# Patient Record
Sex: MALE | Race: WHITE | HISPANIC OR LATINO | Employment: UNEMPLOYED | URBAN - METROPOLITAN AREA
[De-identification: names, ages, dates, MRNs, and addresses within clinical notes are randomized per-mention and may not be internally consistent; named-entity substitution may affect disease eponyms.]

---

## 2018-05-14 ENCOUNTER — OFFICE VISIT (OUTPATIENT)
Dept: CARDIOLOGY CLINIC | Facility: CLINIC | Age: 48
End: 2018-05-14
Payer: COMMERCIAL

## 2018-05-14 VITALS
WEIGHT: 247 LBS | HEIGHT: 69 IN | SYSTOLIC BLOOD PRESSURE: 124 MMHG | HEART RATE: 68 BPM | BODY MASS INDEX: 36.58 KG/M2 | DIASTOLIC BLOOD PRESSURE: 78 MMHG

## 2018-05-14 DIAGNOSIS — I10 ESSENTIAL HYPERTENSION: ICD-10-CM

## 2018-05-14 DIAGNOSIS — R00.2 HEART PALPITATIONS: Primary | ICD-10-CM

## 2018-05-14 DIAGNOSIS — R06.00 DOE (DYSPNEA ON EXERTION): ICD-10-CM

## 2018-05-14 PROBLEM — R06.09 DOE (DYSPNEA ON EXERTION): Status: ACTIVE | Noted: 2018-05-14

## 2018-05-14 PROCEDURE — 99243 OFF/OP CNSLTJ NEW/EST LOW 30: CPT | Performed by: INTERNAL MEDICINE

## 2018-05-14 PROCEDURE — 93000 ELECTROCARDIOGRAM COMPLETE: CPT | Performed by: INTERNAL MEDICINE

## 2018-05-14 RX ORDER — BUPRENORPHINE HYDROCHLORIDE, NALOXONE HYDROCHLORIDE 8; 2 MG/1; MG/1
FILM, SOLUBLE BUCCAL; SUBLINGUAL DAILY
COMMUNITY
Start: 2018-05-04 | End: 2018-10-16

## 2018-05-14 RX ORDER — POLYETHYLENE GLYCOL 3350 17 G/17G
POWDER, FOR SOLUTION ORAL AS NEEDED
COMMUNITY
Start: 2018-03-30

## 2018-05-14 RX ORDER — DOCUSATE SODIUM 100 MG/1
CAPSULE, LIQUID FILLED ORAL AS NEEDED
COMMUNITY
Start: 2018-03-30

## 2018-05-14 RX ORDER — TRAZODONE HYDROCHLORIDE 100 MG/1
TABLET ORAL
COMMUNITY
Start: 2018-04-14

## 2018-05-14 RX ORDER — LISINOPRIL AND HYDROCHLOROTHIAZIDE 25; 20 MG/1; MG/1
1 TABLET ORAL DAILY
COMMUNITY
Start: 2018-05-08

## 2018-05-14 NOTE — PROGRESS NOTES
[place]  1970  [id number]  HEART & VASCULAR [facility] [address]    1  Heart palpitations  POCT ECG    Holter monitor - 24 hour    Echo stress test w contrast if indicated   2  CORTES (dyspnea on exertion)  Echo stress test w contrast if indicated   3  Essential hypertension  Echo stress test w contrast if indicated       Discussion/Summary:  1  CORTES/atypical chest pain  2  HTN  3  Moderate Aortic stenosis  4  ? Healed veg on mitral valve  5  Tobacco abuse    Rec: Will obtain all records from [facility]   Will get a stress echo to eval for functional capacity and rule out ischemia  BP well controlled  Need lipids  CXR was normal   If stress normal will consider PFT's  Needs local PCP as well  Check holter as well  Interval History: 49 Male here for new pt eval   Recent episode of CORTES  This occurs with moderate physical activity  Went to [facility] and had an echo that showed moderate AS  No significant Chest pain  Occasional palpitations  Denies fevers, chills, dizziness, or syncope  +TOB abuse  No LE edema  Feeling better now but wants to establish care  Problem List     CORTES (dyspnea on exertion)    Essential hypertension    Heart palpitations        No past medical history on file  Social History     Social History    Marital status: Unknown     Spouse name: N/A    Number of children: N/A    Years of education: N/A     Occupational History    Not on file  Social History Main Topics    Smoking status: Current Every Day Smoker     Types: Cigarettes    Smokeless tobacco: Never Used    Alcohol use Not on file    Drug use: Unknown    Sexual activity: Not on file     Other Topics Concern    Not on file     Social History Narrative    No narrative on file      No family history on file  No past surgical history on file      Current Outpatient Prescriptions:     docusate sodium (COLACE) 100 mg capsule, as needed, Disp: , Rfl:     lisinopril-hydrochlorothiazide (PRINZIDE,ZESTORETIC) 20-25 MG per tablet, , Disp: , Rfl:     polyethylene glycol (GLYCOLAX) powder, as needed, Disp: , Rfl:     SUBOXONE 8-2 MG, daily, Disp: , Rfl:     traZODone (DESYREL) 100 mg tablet, daily at bedtime as needed, Disp: , Rfl:   No Known Allergies    Labs:     Chemistry    No results found for: NA, K, CL, CO2, BUN, CREATININE, GLU No results found for: CALCIUM, ALKPHOS, AST, ALT, BILITOT         No results found for: CHOL  No results found for: HDL  No results found for: LDLCALC  No results found for: TRIG  No components found for: CHOLHDL    Imaging: No results found  ECG:  NSR      Review of Systems   Constitution: Negative  HENT: Negative  Eyes: Negative  Cardiovascular: Positive for dyspnea on exertion and palpitations  Respiratory: Negative  Endocrine: Negative  Hematologic/Lymphatic: Negative  Skin: Negative  Musculoskeletal: Negative  Gastrointestinal: Negative  Genitourinary: Negative  Neurological: Negative  Psychiatric/Behavioral: Negative  Vitals:    05/14/18 0840   BP: 124/78   Pulse: 68     Vitals:    05/14/18 0840   Weight: 112 kg (247 lb)     Height: 5' 8 5" (174 cm)   Body mass index is 37 01 kg/m²  Physical Exam:  General appearance:  Appears stated age, alert, well appearing and in no distress  HEENT:  PERRLA, EOMI, no scleral icterus, no conjunctival pallor  NECK:  Supple, No elevated JVP, no thyromegaly, no carotid bruits  HEART:  Regular rate and rhythm, normal S1/S2, no S3/S4, no murmur or rub  LUNGS:  Clear to auscultation bilaterally  ABDOMEN:  Soft, non-tender, positive bowel sounds, no rebound or guarding, no organomegaly   EXTREMITIES:  No edema  No clubbing, no deformity      VASCULAR:  Normal pedal pulses   SKIN: No lesions or rashes on exposed skin  NEURO:  CN II-XII intact, no focal deficits

## 2018-05-16 ENCOUNTER — PROCEDURE VISIT (OUTPATIENT)
Dept: CARDIOLOGY CLINIC | Facility: CLINIC | Age: 48
End: 2018-05-16
Payer: COMMERCIAL

## 2018-05-16 DIAGNOSIS — R00.2 PALPITATIONS: Primary | ICD-10-CM

## 2018-05-18 ENCOUNTER — HOSPITAL ENCOUNTER (OUTPATIENT)
Dept: NON INVASIVE DIAGNOSTICS | Facility: HOSPITAL | Age: 48
Discharge: HOME | End: 2018-05-18
Payer: COMMERCIAL

## 2018-05-18 DIAGNOSIS — R00.2 HEART PALPITATIONS: ICD-10-CM

## 2018-05-18 PROCEDURE — 93226 XTRNL ECG REC<48 HR SCAN A/R: CPT

## 2018-05-18 PROCEDURE — 93225 XTRNL ECG REC<48 HRS REC: CPT

## 2018-05-18 PROCEDURE — 93224 XTRNL ECG REC UP TO 48 HRS: CPT | Performed by: INTERNAL MEDICINE

## 2018-06-06 LAB
C TRACH RRNA SPEC QL PROBE: NOT DETECTED
N GONORRHOEA RRNA SPEC QL PROBE: NOT DETECTED

## 2018-06-15 ENCOUNTER — HOSPITAL ENCOUNTER (OUTPATIENT)
Dept: NON INVASIVE DIAGNOSTICS | Facility: CLINIC | Age: 48
Discharge: HOME | End: 2018-06-15
Payer: COMMERCIAL

## 2018-06-15 DIAGNOSIS — R06.00 DOE (DYSPNEA ON EXERTION): ICD-10-CM

## 2018-06-15 DIAGNOSIS — I10 ESSENTIAL HYPERTENSION: ICD-10-CM

## 2018-06-15 DIAGNOSIS — R00.2 HEART PALPITATIONS: ICD-10-CM

## 2018-06-15 LAB
ARRHY DURING EX: NORMAL
CHEST PAIN STATEMENT: NORMAL
MAX DIASTOLIC BP: 90 MMHG
MAX HEART RATE: 122 BPM
MAX PREDICTED HEART RATE: 172 BPM
MAX. SYSTOLIC BP: 160 MMHG
PROTOCOL NAME: NORMAL
TARGET HR FORMULA: NORMAL
TEST INDICATION: NORMAL
TIME IN EXERCISE PHASE: NORMAL

## 2018-06-15 PROCEDURE — 93350 STRESS TTE ONLY: CPT

## 2018-06-15 PROCEDURE — 93351 STRESS TTE COMPLETE: CPT | Performed by: INTERNAL MEDICINE

## 2018-07-26 ENCOUNTER — OFFICE VISIT (OUTPATIENT)
Dept: OTOLARYNGOLOGY | Facility: CLINIC | Age: 48
End: 2018-07-26
Payer: COMMERCIAL

## 2018-07-26 VITALS
SYSTOLIC BLOOD PRESSURE: 128 MMHG | DIASTOLIC BLOOD PRESSURE: 81 MMHG | RESPIRATION RATE: 18 BRPM | HEART RATE: 85 BPM | BODY MASS INDEX: 35.92 KG/M2 | HEIGHT: 68 IN | WEIGHT: 237 LBS

## 2018-07-26 DIAGNOSIS — H72.2X1 MARGINAL PERFORATION OF TYMPANIC MEMBRANE OF RIGHT EAR: Primary | ICD-10-CM

## 2018-07-26 DIAGNOSIS — H90.11 CONDUCTIVE HEARING LOSS OF RIGHT EAR WITH UNRESTRICTED HEARING OF LEFT EAR: ICD-10-CM

## 2018-07-26 DIAGNOSIS — H92.11 CHRONIC OTORRHEA OF RIGHT EAR: ICD-10-CM

## 2018-07-26 PROCEDURE — 99204 OFFICE O/P NEW MOD 45 MIN: CPT | Performed by: SPECIALIST

## 2018-07-26 RX ORDER — BUPRENORPHINE HYDROCHLORIDE, NALOXONE HYDROCHLORIDE 12; 3 MG/1; MG/1
FILM, SOLUBLE BUCCAL; SUBLINGUAL
Refills: 0 | COMMUNITY
Start: 2018-07-19

## 2018-07-26 RX ORDER — ALBUTEROL SULFATE 90 UG/1
2 AEROSOL, METERED RESPIRATORY (INHALATION) EVERY 6 HOURS
COMMUNITY

## 2018-07-26 RX ORDER — BUPRENORPHINE HYDROCHLORIDE, NALOXONE HYDROCHLORIDE 2; .5 MG/1; MG/1
FILM, SOLUBLE BUCCAL; SUBLINGUAL
Refills: 0 | COMMUNITY
Start: 2018-07-19

## 2018-07-26 NOTE — PROGRESS NOTES
Otolaryngology Head and Neck Surgery History and Physical    Chief complaint: Ear Problem        History of the Present Illness    Saint Dings is a 49 y o  who presents with complaints of drainage from his right ear  Patient reported that when he was 21 he had trauma to the right ear with a hand over the ear  It subsequently ruptured his eardrum  He had right ear surgery in around 1996 at [facility]  He reported that he had no problems until month ago when he went swimming  He had reported that his ear popped and then developed some liquid, now the right ear  He was seen by his primary care physician and placed on some antibiotic ear drops  He continues with some right ear discharge  He reports that the hearing is decreased  He has had no complaints of any pain or vertigo  Review of Systems   Constitutional: Negative  HENT: Positive for ear discharge, ear pain and hearing loss  Eyes: Positive for visual disturbance  Respiratory: Negative  Cardiovascular: Negative  Gastrointestinal: Negative  Endocrine: Negative  Genitourinary: Negative  Musculoskeletal: Negative  Skin: Negative  Allergic/Immunologic: Negative  Neurological: Negative  Hematological: Negative  Psychiatric/Behavioral: Negative  All other systems reviewed and are negative  Past Medical History:   Diagnosis Date    Ear problems        Past Surgical History:   Procedure Laterality Date    BACK SURGERY         Social History     Social History    Marital status: Unknown     Spouse name: N/A    Number of children: N/A    Years of education: N/A     Occupational History    Not on file       Social History Main Topics    Smoking status: Current Every Day Smoker     Types: Cigarettes    Smokeless tobacco: Never Used    Alcohol use Yes    Drug use: No    Sexual activity: Yes     Other Topics Concern    Not on file     Social History Narrative    No narrative on file       Family History   Problem Relation Age of Onset    Diabetes Father            /81 (BP Location: Right arm, Patient Position: Sitting, Cuff Size: Standard)   Pulse 85   Resp 18   Ht 5' 8" (1 727 m)   Wt 108 kg (237 lb)   BMI 36 04 kg/m²     Physical Exam   Constitutional: He is oriented to person, place, and time  He appears well-developed and well-nourished  HENT:   Head: Normocephalic and atraumatic  Right Ear: External ear normal  No drainage, swelling or tenderness  No mastoid tenderness  Tympanic membrane is not injected and not perforated  Tympanic membrane mobility is normal  No middle ear effusion  Left Ear: External ear normal  No drainage, swelling or tenderness  No mastoid tenderness  Tympanic membrane is not injected and not perforated  Tympanic membrane mobility is normal   No middle ear effusion  Ears:    Nose: Nose normal  No mucosal edema, rhinorrhea, sinus tenderness, nasal deformity or septal deviation  Right sinus exhibits no maxillary sinus tenderness and no frontal sinus tenderness  Left sinus exhibits no maxillary sinus tenderness and no frontal sinus tenderness  Mouth/Throat: Uvula is midline and oropharynx is clear and moist  Mucous membranes are not pale and not dry  No oral lesions  Normal dentition  Eyes: Conjunctivae and EOM are normal  Pupils are equal, round, and reactive to light  Neck: Normal range of motion  Neck supple  No JVD present  No tracheal deviation present  No thyromegaly present  Cardiovascular:   Carotid normal   Jugular no distension   Pulmonary/Chest: No respiratory distress  Abdominal: Soft  He exhibits no distension  Musculoskeletal: Normal range of motion  Lymphadenopathy:     He has no cervical adenopathy  Neurological: He is alert and oriented to person, place, and time  No cranial nerve deficit  Skin: Skin is warm  No rash noted  No erythema  Psychiatric: He has a normal mood and affect   His behavior is normal  Thought content normal          Procedure:      Imaging studies:      Pertinent laboratory data:      Assessment and plan:    1  Marginal perforation of tympanic membrane of right ear  Comprehensive hearing test   2  Chronic otorrhea of right ear     3  Conductive hearing loss of right ear with unrestricted hearing of left ear           1  Patient with perforation right tympanic membrane  Possibly related to when he went swimming  He did dive off of a board which may have caused the rupture of the drum     This time he has some discharge and significant erythema of the drum  I started him on ciprofloxacin ophthalmic drops and dexamethasone ophthalmic drops 2 drops q h s  for 2 weeks and then every Monday and Friday until he is seen back  In addition we will get a hearing test to document his baseline hearing  When we get the year so that we could consider going back for additional surgery

## 2018-07-31 ENCOUNTER — OFFICE VISIT (OUTPATIENT)
Dept: AUDIOLOGY | Age: 48
End: 2018-07-31
Payer: COMMERCIAL

## 2018-07-31 DIAGNOSIS — H90.71 MIXED CONDUCTIVE AND SENSORINEURAL HEARING LOSS, UNILATERAL, RIGHT EAR, WITH UNRESTRICTED HEARING ON THE CONTRALATERAL SIDE: Primary | ICD-10-CM

## 2018-07-31 PROCEDURE — 92557 COMPREHENSIVE HEARING TEST: CPT | Performed by: AUDIOLOGIST-HEARING AID FITTER

## 2018-07-31 PROCEDURE — 92567 TYMPANOMETRY: CPT | Performed by: AUDIOLOGIST-HEARING AID FITTER

## 2018-07-31 NOTE — PROGRESS NOTES
HEARING EVALUATION    Name:  Collette Littles  :  1970  Age:  49 y o  Date of Evaluation: 18     History: Ear Infection, Difficulty Understanding and right tympanoplasty   Reason for visit: Collette Littles is being seen today at the request of Dr Poppy Augustine for an evaluation of hearing  Patient reports recent rupture of right ear drum after swimming  He reports right ear drainage  He reportedly saw an ENT recently who requested today's hearing evaluation as part of medical workup given right perforation  EVALUATION:    Otoscopic Evaluation:   Right Ear: perforation   Left Ear: Clear and healthy ear canal and tympanic membrane    Tympanometry:   Right: Type B (large ear canal volume) - perforated eardrum    Left: Type A - normal middle ear pressure and compliance    Audiogram Results:  Pure tone testing revealed overall normal hearing sensitivity in the left ear and a severe mixed hearing loss in the right ear  SRT and PTA are in agreement indicating good test reliability  Word recognition scores were excellent bilaterally  *see attached audiogram      RECOMMENDATIONS:  Consult ENT, Return to [facility]  for F/U and Copy to Patient/Caregiver  Repeat hearing evaluation post medical management by ENT  PATIENT EDUCATION:   Discussed results and recommendations with Jose Alejandro Hager  Questions were addressed and the patient was encouraged to contact our department should concerns arise        Clemencia Flannery   Clinical Audiologist

## 2018-07-31 NOTE — LETTER
2018     Chad Kana, DO  [address]    Patient: Sara Chavis   YOB: 1970   Date of Visit: 2018       Dear Dr Aileen Miles:    Thank you for referring Sara Chavis to me for evaluation  Below are my notes for this consultation  If you have questions, please do not hesitate to call me  I look forward to following your patient along with you  Sincerely,        Clarence Arra        CC: No Recipients  Clarence Arra  2018  2:52 PM  Sign at close encounter  HEARING EVALUATION    Name:  Sara Chavis  :  1970  Age:  49 y o  Date of Evaluation: 18     History: Ear Infection, Difficulty Understanding and right tympanoplasty   Reason for visit: Sara Chavis is being seen today at the request of Dr Aileen Miles for an evaluation of hearing  Patient reports recent rupture of right ear drum after swimming  He reports right ear drainage  He reportedly saw an ENT recently who requested today's hearing evaluation as part of medical workup given right perforation  EVALUATION:    Otoscopic Evaluation:   Right Ear: perforation   Left Ear: Clear and healthy ear canal and tympanic membrane    Tympanometry:   Right: Type B (large ear canal volume) - perforated eardrum    Left: Type A - normal middle ear pressure and compliance    Audiogram Results:  Pure tone testing revealed overall normal hearing sensitivity in the left ear and a severe mixed hearing loss in the right ear  SRT and PTA are in agreement indicating good test reliability  Word recognition scores were excellent bilaterally  *see attached audiogram      RECOMMENDATIONS:  Consult ENT, Return to [facility]  for F/U and Copy to Patient/Caregiver  Repeat hearing evaluation post medical management by ENT  PATIENT EDUCATION:   Discussed results and recommendations with Taylor García  Questions were addressed and the patient was encouraged to contact our department should concerns arise        Oralia Meredith Clemencia Sams   Clinical Audiologist

## 2018-10-16 ENCOUNTER — OFFICE VISIT (OUTPATIENT)
Dept: CARDIOLOGY CLINIC | Facility: CLINIC | Age: 48
End: 2018-10-16
Payer: COMMERCIAL

## 2018-10-16 VITALS
BODY MASS INDEX: 36.58 KG/M2 | HEIGHT: 69 IN | SYSTOLIC BLOOD PRESSURE: 136 MMHG | HEART RATE: 78 BPM | WEIGHT: 247 LBS | DIASTOLIC BLOOD PRESSURE: 82 MMHG

## 2018-10-16 DIAGNOSIS — I35.0 MODERATE AORTIC STENOSIS: ICD-10-CM

## 2018-10-16 DIAGNOSIS — R00.2 HEART PALPITATIONS: ICD-10-CM

## 2018-10-16 DIAGNOSIS — I10 ESSENTIAL HYPERTENSION: Primary | ICD-10-CM

## 2018-10-16 DIAGNOSIS — R06.00 DOE (DYSPNEA ON EXERTION): ICD-10-CM

## 2018-10-16 PROCEDURE — 99214 OFFICE O/P EST MOD 30 MIN: CPT | Performed by: INTERNAL MEDICINE

## 2018-10-16 RX ORDER — DULOXETIN HYDROCHLORIDE 60 MG/1
60 CAPSULE, DELAYED RELEASE ORAL DAILY
COMMUNITY
Start: 2018-10-16

## 2018-10-16 NOTE — PROGRESS NOTES
Beverly Casey  1970  [id number]  HEART & VASCULAR [facility] [address]    1  Essential hypertension     2  Heart palpitations     3  CORTES (dyspnea on exertion)     4  Moderate aortic stenosis  Echo complete with contrast if indicated       Discussion/Summary:  1  CORTES/atypical chest pain  2  HTN  3  Moderate Aortic stenosis  4  ? Healed veg on mitral valve  5  Tobacco abuse    Rec:  Overall he has been doing well since her last visit  He has a good functional capacity with minimal symptoms of shortness of breath  His stress echo showed no ischemia  Aortic stenosis has been moderate  With activity gradients go up but that would be expected  I have ordered a formal transthoracic echocardiogram to be reviewed by myself to determine the degree of aortic stenosis and also evaluate for mitral regurgitation  He has me if it was okay to donate plasma I do not see a problem with this      Interval History: 49 Male here for new pt eval   Recent episode of CORTES  This occurs with moderate physical activity  He denies any exertional chest pressure heaviness  He does have moderate aortic stenosis seen on echocardiogram at [facility] and also on stress echo here  Functional capacity has been acceptable  He is a smoker he has shortness of breath for some time  Need to evaluate whether the aortic valve is bicuspid trileaflet  He denies any lower extremity edema, PND, orthopnea  Problem List     CORTES (dyspnea on exertion)    Essential hypertension    Heart palpitations        Past Medical History:   Diagnosis Date    Ear problems      Social History     Social History    Marital status: Unknown     Spouse name: N/A    Number of children: N/A    Years of education: N/A     Occupational History    Not on file       Social History Main Topics    Smoking status: Current Every Day Smoker     Types: Cigarettes    Smokeless tobacco: Never Used  Alcohol use Yes    Drug use: No    Sexual activity: Yes     Other Topics Concern    Not on file     Social History Narrative    No narrative on file      Family History   Problem Relation Age of Onset    Diabetes Father      Past Surgical History:   Procedure Laterality Date    BACK SURGERY         Current Outpatient Prescriptions:     albuterol (PROVENTIL HFA,VENTOLIN HFA) 90 mcg/act inhaler, Inhale 2 puffs every 6 (six) hours, Disp: , Rfl:     docusate sodium (COLACE) 100 mg capsule, as needed, Disp: , Rfl:     DULoxetine (CYMBALTA) 30 mg delayed release capsule, , Disp: , Rfl:     lisinopril-hydrochlorothiazide (PRINZIDE,ZESTORETIC) 20-25 MG per tablet, Take 1 tablet by mouth daily  , Disp: , Rfl:     polyethylene glycol (GLYCOLAX) powder, as needed, Disp: , Rfl:     SUBOXONE 12-3 MG FILM, TAKE 1 FILM SUBLINGUALY WITH 2MG STRIPS TOTAL 14MG DAILY, Disp: , Rfl: 0    SUBOXONE 2-0 5 MG, TAKE 1 FILM SUBLINGUALY EVERY DAY TOTAL 14MG DAILY, Disp: , Rfl: 0    traZODone (DESYREL) 100 mg tablet, daily at bedtime as needed, Disp: , Rfl:   No Known Allergies    Labs:     Chemistry    No results found for: NA, K, CL, CO2, BUN, CREATININE, GLU No results found for: CALCIUM, ALKPHOS, AST, ALT, BILITOT         No results found for: CHOL  No results found for: HDL  No results found for: LDLCALC  No results found for: TRIG  No components found for: CHOLHDL    Imaging: No results found  ECG:  NSR      Review of Systems   Constitution: Negative  HENT: Negative  Eyes: Negative  Cardiovascular: Positive for dyspnea on exertion and palpitations  Respiratory: Negative  Endocrine: Negative  Hematologic/Lymphatic: Negative  Skin: Negative  Musculoskeletal: Negative  Gastrointestinal: Negative  Genitourinary: Negative  Neurological: Negative  Psychiatric/Behavioral: Negative          Vitals:    10/16/18 1115   BP: 136/82   Pulse: 78     Vitals:    10/16/18 1115   Weight: 112 kg (247 lb) Height: 5' 8 5" (174 cm)   Body mass index is 37 01 kg/m²  Physical Exam:  General appearance:  Appears stated age, alert, well appearing and in no distress  HEENT:  PERRLA, EOMI, no scleral icterus, no conjunctival pallor  NECK:  Supple, No elevated JVP, no thyromegaly, no carotid bruits  HEART:  Regular rate and rhythm, normal S1/S2, no S3/S4, no murmur or rub  LUNGS:  Clear to auscultation bilaterally  ABDOMEN:  Soft, non-tender, positive bowel sounds, no rebound or guarding, no organomegaly   EXTREMITIES:  No edema  No clubbing, no deformity      VASCULAR:  Normal pedal pulses   SKIN: No lesions or rashes on exposed skin  NEURO:  CN II-XII intact, no focal deficits

## 2018-11-13 ENCOUNTER — HOSPITAL ENCOUNTER (OUTPATIENT)
Dept: NON INVASIVE DIAGNOSTICS | Facility: CLINIC | Age: 48
Discharge: HOME | End: 2018-11-13
Payer: COMMERCIAL

## 2018-11-13 DIAGNOSIS — I35.0 MODERATE AORTIC STENOSIS: ICD-10-CM

## 2018-11-13 PROCEDURE — 93306 TTE W/DOPPLER COMPLETE: CPT | Performed by: INTERNAL MEDICINE

## 2018-11-13 PROCEDURE — 93306 TTE W/DOPPLER COMPLETE: CPT

## 2019-02-22 ENCOUNTER — TELEPHONE (OUTPATIENT)
Dept: CARDIOLOGY CLINIC | Facility: CLINIC | Age: 49
End: 2019-02-22

## 2019-02-22 NOTE — TELEPHONE ENCOUNTER
FYI:  Pt concerned he is experiencing discomfort when he pushes on the center of his chest, coughs, or moves   such as sitting up in bed  Stated he has not done any heavy lifting to indicate a muscle injury  Advised to call PCP or go to urgent care to be evaluated     Pt scheduled an OV for 4/26/19

## 2019-04-26 ENCOUNTER — OFFICE VISIT (OUTPATIENT)
Dept: CARDIOLOGY CLINIC | Facility: CLINIC | Age: 49
End: 2019-04-26
Payer: COMMERCIAL

## 2019-04-26 VITALS
HEART RATE: 68 BPM | HEIGHT: 69 IN | DIASTOLIC BLOOD PRESSURE: 86 MMHG | WEIGHT: 248 LBS | SYSTOLIC BLOOD PRESSURE: 148 MMHG | BODY MASS INDEX: 36.73 KG/M2

## 2019-04-26 DIAGNOSIS — R06.00 DOE (DYSPNEA ON EXERTION): ICD-10-CM

## 2019-04-26 DIAGNOSIS — I10 ESSENTIAL HYPERTENSION: ICD-10-CM

## 2019-04-26 DIAGNOSIS — R00.2 HEART PALPITATIONS: ICD-10-CM

## 2019-04-26 DIAGNOSIS — I35.0 MODERATE AORTIC STENOSIS: Primary | ICD-10-CM

## 2019-04-26 PROCEDURE — 99214 OFFICE O/P EST MOD 30 MIN: CPT | Performed by: INTERNAL MEDICINE

## 2019-04-26 RX ORDER — BACLOFEN 10 MG/1
10 TABLET ORAL
COMMUNITY

## 2021-08-03 ENCOUNTER — HOSPITAL ENCOUNTER (EMERGENCY)
Facility: HOSPITAL | Age: 51
Discharge: HOME | End: 2021-08-03
Attending: EMERGENCY MEDICINE | Admitting: EMERGENCY MEDICINE
Payer: COMMERCIAL

## 2021-08-03 VITALS
TEMPERATURE: 97.3 F | OXYGEN SATURATION: 96 % | DIASTOLIC BLOOD PRESSURE: 98 MMHG | SYSTOLIC BLOOD PRESSURE: 194 MMHG | HEART RATE: 80 BPM | RESPIRATION RATE: 18 BRPM

## 2021-08-03 DIAGNOSIS — L03.90 CELLULITIS: Primary | ICD-10-CM

## 2021-08-03 PROCEDURE — 99284 EMERGENCY DEPT VISIT MOD MDM: CPT | Performed by: EMERGENCY MEDICINE

## 2021-08-03 PROCEDURE — 99282 EMERGENCY DEPT VISIT SF MDM: CPT

## 2021-08-03 RX ORDER — CEPHALEXIN 500 MG/1
500 CAPSULE ORAL EVERY 6 HOURS SCHEDULED
Qty: 20 CAPSULE | Refills: 0 | Status: SHIPPED | OUTPATIENT
Start: 2021-08-03 | End: 2021-08-03

## 2021-08-03 NOTE — ED PROVIDER NOTES
History  Chief Complaint   Patient presents with    Abscess     pt reports abscess on right buttocks since this morning  reports tenderness     58-year-old male history of asthma, hypertension, presenting due to lesion on buttock  Patient states he noticed a pimple like mass on his right buttock that started yesterday and has increased in size today  States it is slightly irritating but not painful to palpation  Believes it is hard to touch and increasing in size  Denies any fevers, chills, nausea or vomiting  Denies any hematochezia, constipation, pain with defecation, hematuria, urinary symptoms, injury or trauma  Denies any history of abscesses  Prior to Admission Medications   Prescriptions Last Dose Informant Patient Reported? Taking?    DULoxetine (CYMBALTA) 60 mg delayed release capsule  Self Yes No   Sig: Take 60 mg by mouth daily    SUBOXONE 12-3 MG FILM  Self Yes No   Sig: TAKE 1 FILM SUBLINGUALY WITH 2MG STRIPS TOTAL 14MG DAILY   SUBOXONE 2-0 5 MG  Self Yes No   Sig: TAKE 1 FILM SUBLINGUALY EVERY DAY TOTAL 14MG DAILY   albuterol (PROVENTIL HFA,VENTOLIN HFA) 90 mcg/act inhaler  Self Yes No   Sig: Inhale 2 puffs every 6 (six) hours   baclofen 10 mg tablet  Self Yes No   Sig: Take 10 mg by mouth Daily for 6 days   docusate sodium (COLACE) 100 mg capsule  Self Yes No   Sig: as needed   lisinopril-hydrochlorothiazide (PRINZIDE,ZESTORETIC) 20-25 MG per tablet  Self Yes No   Sig: Take 1 tablet by mouth daily     polyethylene glycol (GLYCOLAX) powder  Self Yes No   Sig: as needed   traZODone (DESYREL) 100 mg tablet  Self Yes No   Sig: daily at bedtime as needed      Facility-Administered Medications: None       Past Medical History:   Diagnosis Date    Ear problems        Past Surgical History:   Procedure Laterality Date    BACK SURGERY      KNEE SURGERY Left 01/23/2019       Family History   Problem Relation Age of Onset    Diabetes Father      I have reviewed and agree with the history as documented  E-Cigarette/Vaping     E-Cigarette/Vaping Substances     Social History     Tobacco Use    Smoking status: Current Every Day Smoker     Types: Cigarettes    Smokeless tobacco: Never Used   Substance Use Topics    Alcohol use: Yes    Drug use: No        Review of Systems   Constitutional: Negative for chills and fever  HENT: Negative for ear pain and sore throat  Eyes: Negative for visual disturbance  Respiratory: Negative for cough and shortness of breath  Cardiovascular: Negative for chest pain and palpitations  Gastrointestinal: Negative for abdominal pain, anal bleeding, blood in stool, constipation, rectal pain and vomiting  Genitourinary: Negative for dysuria  Musculoskeletal: Negative for arthralgias and back pain  Skin: Negative for color change and rash  Small area of induration to right buttock   Neurological: Negative for syncope  All other systems reviewed and are negative  Physical Exam  ED Triage Vitals [08/03/21 0727]   Temperature Pulse Respirations Blood Pressure SpO2   (!) 97 3 °F (36 3 °C) 80 18 (!) 194/98 96 %      Temp Source Heart Rate Source Patient Position - Orthostatic VS BP Location FiO2 (%)   Tympanic Monitor Sitting Right arm --      Pain Score       7             Orthostatic Vital Signs  Vitals:    08/03/21 0727   BP: (!) 194/98   Pulse: 80   Patient Position - Orthostatic VS: Sitting       Physical Exam  Vitals and nursing note reviewed  Constitutional:       Appearance: He is well-developed  HENT:      Head: Normocephalic and atraumatic  Eyes:      Conjunctiva/sclera: Conjunctivae normal    Cardiovascular:      Rate and Rhythm: Normal rate and regular rhythm  Heart sounds: No murmur heard  Pulmonary:      Effort: Pulmonary effort is normal  No respiratory distress  Breath sounds: Normal breath sounds  Abdominal:      Palpations: Abdomen is soft  Tenderness: There is no abdominal tenderness     Musculoskeletal: Cervical back: Neck supple  Skin:     General: Skin is warm and dry  Comments: Small 1 centimeter area of induration and erythema around 3 inches lateral from rectum   Neurological:      Mental Status: He is alert and oriented to person, place, and time  Psychiatric:         Mood and Affect: Mood normal          ED Medications  Medications - No data to display    Diagnostic Studies  Results Reviewed     None                 No orders to display         Procedures  Procedures      ED Course                                       MDM  Number of Diagnoses or Management Options  Cellulitis  Diagnosis management comments: Ultrasound was placed over area of induration and no area of abscess or fluid was noted  Concern for local cellulitic infections so patient was given short course of antibiotics  Will follow-up with PCP and return precautions advised      Disposition  Final diagnoses:   Cellulitis     Time reflects when diagnosis was documented in both MDM as applicable and the Disposition within this note     Time User Action Codes Description Comment    8/3/2021  8:06 AM Erin Linder [[id number]] Cellulitis       ED Disposition     ED Disposition Condition Date/Time Comment    Discharge Stable Tues Aug 3, 2021  8:06 AM Collette Littles discharge to home/self care            Follow-up Information     Follow up With Specialties Details Why Contact Info Additional [address]   [address], [address] [phone]  [address], [address], [address] [place], [place], [address]          Current Discharge Medication List      START taking these medications    Details   albuterol (PROVENTIL HFA,VENTOLIN HFA) 90 mcg/act inhaler Inhale 2 puffs every 6 (six) hours      baclofen 10 mg tablet Take 10 mg by mouth Daily for 6 days      docusate sodium (COLACE) 100 mg capsule as needed      DULoxetine (CYMBALTA) 60 mg delayed release capsule Take 60 mg by mouth daily       lisinopril-hydrochlorothiazide (PRINZIDE,ZESTORETIC) 20-25 MG per tablet Take 1 tablet by mouth daily        polyethylene glycol (GLYCOLAX) powder as needed      SUBOXONE 12-3 MG FILM TAKE 1 FILM SUBLINGUALY WITH 2MG STRIPS TOTAL 14MG DAILY  Refills: 0      SUBOXONE 2-0 5 MG TAKE 1 FILM SUBLINGUALY EVERY DAY TOTAL 14MG DAILY  Refills: 0      traZODone (DESYREL) 100 mg tablet daily at bedtime as needed           No discharge procedures on file  PDMP Review     None           ED Provider  Attending physically available and evaluated Ry Pastures  I managed the patient along with the ED Attending      Electronically Signed by         Gabby West,   08/03/21 [id number] Diane Orantes,   08/25/21 [id number]

## 2021-08-03 NOTE — DISCHARGE INSTRUCTIONS
Antibiotic 7 sent to your pharmacy  Please take as prescribed  Please schedule follow-up appointment with her PCP in regards to recent hospital visit  If you do not have a PCP, please use the contact information provided in order to establish care

## 2021-08-04 NOTE — ED ATTENDING ATTESTATION
8/3/2021  IArjun MD, saw and evaluated the patient  I have discussed the patient with the resident/non-physician practitioner and agree with the resident's/non-physician practitioner's findings, Plan of Care, and MDM as documented in the resident's/non-physician practitioner's note, except where noted  All available labs and Radiology studies were reviewed  I was present for key portions of any procedure(s) performed by the resident/non-physician practitioner and I was immediately available to provide assistance  At this point I agree with the current assessment done in the Emergency Department  I have conducted an independent evaluation of this patient a history and physical is as follows:    ED Course     Patient is a 77-year-old male presents with pain localized over the right buttock  Patient states that head lesion there which increased size    Denies fevers chills back pain abdominal pain pain with defecation rectal pain as prior history of abscesses    Vitals reviewed    Examination of the bike shows a 1 cm area of induration erythema or buttocks  Patient does not appear to involve the rectum  There is no obvious cutaneous abscess on point of care bedside ultrasound  Suspect likely cellulitis    Impression:  Cellulitis versus resolving abscess  Discussed plan of care patient will provide patient prescription for antibiotics patient counseled to monitor area for recurrence of symptoms  Return precautions given       Critical Care Time  Procedures

## 2021-09-01 ENCOUNTER — HOSPITAL ENCOUNTER (OUTPATIENT)
Dept: RADIOLOGY | Facility: HOSPITAL | Age: 51
Discharge: HOME | End: 2021-09-01
Payer: COMMERCIAL

## 2021-09-01 DIAGNOSIS — Z12.2 ENCOUNTER FOR SCREENING FOR LUNG CANCER: ICD-10-CM

## 2021-09-01 PROCEDURE — 71271 CT THORAX LUNG CANCER SCR C-: CPT

## 2022-04-05 ENCOUNTER — OFFICE VISIT (OUTPATIENT)
Dept: PODIATRY | Facility: CLINIC | Age: 52
End: 2022-04-05
Payer: MEDICARE

## 2022-04-05 VITALS
HEIGHT: 69 IN | DIASTOLIC BLOOD PRESSURE: 82 MMHG | SYSTOLIC BLOOD PRESSURE: 110 MMHG | WEIGHT: 258 LBS | BODY MASS INDEX: 38.21 KG/M2

## 2022-04-05 DIAGNOSIS — M72.2 PLANTAR FASCIITIS: Primary | ICD-10-CM

## 2022-04-05 DIAGNOSIS — M79.672 LEFT FOOT PAIN: ICD-10-CM

## 2022-04-05 DIAGNOSIS — E11.9 CONTROLLED TYPE 2 DIABETES MELLITUS WITHOUT COMPLICATION, WITHOUT LONG-TERM CURRENT USE OF INSULIN (HCC): ICD-10-CM

## 2022-04-05 PROCEDURE — 99204 OFFICE O/P NEW MOD 45 MIN: CPT | Performed by: PODIATRIST

## 2022-04-05 RX ORDER — DAPAGLIFLOZIN 5 MG/1
TABLET, FILM COATED ORAL
COMMUNITY
Start: 2022-01-11

## 2022-04-05 RX ORDER — AMLODIPINE BESYLATE 10 MG/1
TABLET ORAL
COMMUNITY
Start: 2022-03-03

## 2022-04-05 RX ORDER — HYDROCHLOROTHIAZIDE 25 MG/1
TABLET ORAL
COMMUNITY
Start: 2022-02-07

## 2022-04-05 RX ORDER — INSULIN GLARGINE 100 [IU]/ML
INJECTION, SOLUTION SUBCUTANEOUS
COMMUNITY
Start: 2022-04-04

## 2022-04-05 RX ORDER — GLIPIZIDE 10 MG/1
TABLET ORAL
COMMUNITY
Start: 2022-01-11

## 2022-04-05 RX ORDER — ASPIRIN 81 MG/1
TABLET ORAL
COMMUNITY
Start: 2022-02-07

## 2022-04-05 RX ORDER — PEN NEEDLE, DIABETIC 31 GX5/16"
NEEDLE, DISPOSABLE MISCELLANEOUS
COMMUNITY
Start: 2021-12-27

## 2022-04-05 RX ORDER — ATORVASTATIN CALCIUM 20 MG/1
TABLET, FILM COATED ORAL
COMMUNITY
Start: 2022-02-07

## 2022-04-05 RX ORDER — GABAPENTIN 300 MG/1
CAPSULE ORAL
COMMUNITY
Start: 2022-04-04

## 2022-04-05 RX ORDER — LANCING DEVICE
EACH MISCELLANEOUS
COMMUNITY
Start: 2022-03-03

## 2022-04-05 NOTE — PROGRESS NOTES
PATIENT:  Olga Alvarez  1970       ASSESSMENT:     1  Plantar fasciitis  XR heel / calcaneus 2+ vw left    Ambulatory referral to Physical Therapy   2  Left foot pain     3  Controlled type 2 diabetes mellitus without complication, without long-term current use of insulin ([facility] )             PLAN:  1  Patient was counseled and educated on the condition and the diagnosis  2  X-ray was obtained and personally reviewed  The radiological findings were discussed with the patient  3  The exam and symptoms are consistent with plantar fasciitis  The diagnosis, treatment options and prognosis were discussed with the patient  4  Hold injection due to diabetes  Awaits blood work  5  Referred him to PT/OT  Instructed supportive care, home exercise, icing, and proper footwear/ arch support  6  Educated disease prevention and risks related to diabetes  Educated proper daily foot care and exam   Instructed proper skin care / protection and footwear  Instructed to identify any signs of infection and related foot problem  7   Discussed proper blood glucose control with diet and exercise  8   Recommended compression stockings for LE edema  9   Patient will return in 4 weeks for re-evaluation  Subjective:       HPI  The patient presents with chief complaint of left foot pain for about 3 months  He has sharp, throbbing pain on left plantar heel with walking  He also has post-static dyskinesia  The symptoms have been worse since the onset  The patient does not recall any injury  No acute swelling, but she has chronic swelling in his legs  The patient has diabetes about a year  His blood glucose is up and down  He has not had blood work for a while  The patient denied any history of diabetic foot ulcer, related foot infection, or amputation  No significant numbness  He has mild paresthesia in his toes  Denied weakness or significant functional deficit          The following portions of the patient's history were reviewed and updated as appropriate: allergies, current medications, past family history, past medical history, past social history, past surgical history and problem list   All pertinent labs and images were reviewed  Past Medical History  Past Medical History:   Diagnosis Date    Arthritis     back and leg pain    Asthma     Diabetes ([address] )     Ear problems        Past Surgical History  Past Surgical History:   Procedure Laterality Date    BACK SURGERY      KNEE SURGERY Left 01/23/2019        Allergies:  Patient has no known allergies  Medications:  Current Outpatient Medications   Medication Sig Dispense Refill    albuterol (PROVENTIL HFA,VENTOLIN HFA) 90 mcg/act inhaler Inhale 2 puffs every 6 (six) hours      amLODIPine (NORVASC) 10 mg tablet TAKE 1 TABLET(S) EVERY DAY BY ORAL ROUTE FOR 90 DAYS   aspirin (ECOTRIN LOW STRENGTH) 81 mg EC tablet TAKE 1 TABLET(S) EVERY DAY BY ORAL ROUTE FOR 90 DAYS   atorvastatin (LIPITOR) 20 mg tablet TAKE 1 TABLET(S) EVERY DAY BY ORAL ROUTE FOR 90 DAYS  CHOLESTEROL PILL      B-D UF III MINI PEN NEEDLES 31G X 5 MM MISC USE AS DIRECTED WITH INSULINE      baclofen 10 mg tablet Take 10 mg by mouth Daily for 6 days      docusate sodium (COLACE) 100 mg capsule as needed      DULoxetine (CYMBALTA) 60 mg delayed release capsule Take 60 mg by mouth daily       Farxiga 5 MG TABS TAKE 1 TABLET EVERY DAY BY ORAL ROUTE FOR 30 DAYS   fluticasone-salmeterol (Advair) 500-50 mcg/dose inhaler INHALE 1 PUFF(S) TWICE A DAY BY INHALATION ROUTE      gabapentin (NEURONTIN) 300 mg capsule TAKE 1 CAPSULE 3 TIMES A DAY BY ORAL ROUTE FOR 90 DAYS   glipiZIDE (GLUCOTROL) 10 mg tablet TAKE 1 TABLET EVERY DAY BY ORAL ROUTE   hydrochlorothiazide (HYDRODIURIL) 25 mg tablet TAKE 1 TABLET(S) EVERY DAY BY ORAL ROUTE FOR 90 DAYS        Lantus SoloStar 100 units/mL injection pen INJECT 26 UNITS EVERY DAY BY SUBCUTANEOUS ROUTE IN THE MORNING FOR 30 DAYS   lisinopril-hydrochlorothiazide (PRINZIDE,ZESTORETIC) 20-25 MG per tablet Take 1 tablet by mouth daily        metFORMIN (GLUCOPHAGE) 1000 MG tablet TAKE 1 TABLET(S) TWICE A DAY BY ORAL ROUTE FOR 90 DAYS   Pharmacist Choice Lancets MISC USE AS DIRECTED  TEST EVERY DAY PER MD      polyethylene glycol (GLYCOLAX) powder as needed      SUBOXONE 12-3 MG FILM TAKE 1 FILM SUBLINGUALY WITH 2MG STRIPS TOTAL 14MG DAILY  0    SUBOXONE 2-0 5 MG TAKE 1 FILM SUBLINGUALY EVERY DAY TOTAL 14MG DAILY  0    traZODone (DESYREL) 100 mg tablet daily at bedtime as needed       No current facility-administered medications for this visit  Social History:  Social History     Socioeconomic History    Marital status: Unknown     Spouse name: None    Number of children: None    Years of education: None    Highest education level: None   Occupational History    None   Tobacco Use    Smoking status: Current Every Day Smoker     Types: Cigarettes    Smokeless tobacco: Never Used   Substance and Sexual Activity    Alcohol use: Yes    Drug use: No    Sexual activity: Yes   Other Topics Concern    None   Social History Narrative    None     Social Determinants of Health     Financial Resource Strain: Not on file   Food Insecurity: Not on file   Transportation Needs: Not on file   Physical Activity: Not on file   Stress: Not on file   Social Connections: Not on file   Intimate Partner Violence: Not on file   Housing Stability: Not on file          Review of Systems   Constitutional: Negative for appetite change, chills and fever  HENT: Negative for sore throat  Eyes: Negative for visual disturbance  Respiratory: Negative for cough and shortness of breath  Cardiovascular: Positive for leg swelling  Negative for chest pain  Gastrointestinal: Negative for diarrhea, nausea and vomiting  Musculoskeletal: Positive for arthralgias  Skin: Negative for rash and wound     Neurological: Negative for weakness and numbness  Hematological: Negative  Psychiatric/Behavioral: Negative for behavioral problems and confusion  Objective:      /82   Ht 5' 8 5" (1 74 m) Comment: verbal  Wt 117 kg (258 lb)   BMI 38 66 kg/m²          Physical Exam  Vitals reviewed  Constitutional:       General: He is not in acute distress  Appearance: He is obese  He is not toxic-appearing  HENT:      Head: Normocephalic and atraumatic  Eyes:      Extraocular Movements: Extraocular movements intact  Cardiovascular:      Rate and Rhythm: Normal rate and regular rhythm  Pulses: Normal pulses  no weak pulses          Dorsalis pedis pulses are 2+ on the right side and 2+ on the left side  Posterior tibial pulses are 2+ on the right side and 2+ on the left side  Pulmonary:      Effort: Pulmonary effort is normal  No respiratory distress  Musculoskeletal:         General: Tenderness present  No swelling, deformity or signs of injury  Cervical back: Normal range of motion and neck supple  Right lower leg: Edema present  Left lower leg: Edema present  Right foot: No Charcot foot or foot drop  Left foot: No Charcot foot or foot drop  Comments: Focal pain in left plantar heel and instep  No acute edema  No pain on lateral compression left heel  Tight calf / achilles with ankle equinus  Feet:      Right foot:      Protective Sensation: 10 sites tested  10 sites sensed  Skin integrity: No ulcer, skin breakdown or erythema  Left foot:      Protective Sensation: 10 sites tested  10 sites sensed  Skin integrity: No ulcer, skin breakdown or erythema  Skin:     General: Skin is warm and dry  Capillary Refill: Capillary refill takes less than 2 seconds  Coloration: Skin is not cyanotic or mottled  Findings: No abscess, ecchymosis, erythema or wound  Nails: There is no clubbing  Neurological:      General: No focal deficit present  Mental Status: He is alert and oriented to person, place, and time  Cranial Nerves: No cranial nerve deficit  Sensory: No sensory deficit  Motor: No weakness  Coordination: Coordination normal    Psychiatric:         Mood and Affect: Mood normal          Behavior: Behavior normal          Thought Content: Thought content normal          Judgment: Judgment normal            Diabetic Foot Exam    Patient's shoes and socks removed  Right Foot/Ankle   Right Foot Inspection  Skin Exam: skin intact  No erythema, no maceration, no pre-ulcer and no ulcer  Toe Exam: No swelling, no tenderness, erythema and  no right toe deformity    Sensory   Vibration: diminished  Proprioception: intact  Monofilament testing: intact    Vascular  Capillary refills: < 3 seconds  The right DP pulse is 2+  The right PT pulse is 2+  Left Foot/Ankle  Left Foot Inspection  Skin Exam: skin intact  No erythema, no maceration, no pre-ulcer and no ulcer  Toe Exam: No swelling, no tenderness, no erythema and no left toe deformity  Sensory   Vibration: diminished  Proprioception: intact  Monofilament testing: intact    Vascular  Capillary refills: < 3 seconds  The left DP pulse is 2+  The left PT pulse is 2+       Assign Risk Category  No deformity present  No loss of protective sensation  No weak pulses  Risk: 0

## 2022-04-05 NOTE — PATIENT INSTRUCTIONS
Foot Care for People with Diabetes   AMBULATORY CARE:   What you need to know about foot care:   · Foot care helps protect your feet and prevent foot ulcers or sores  Long-term high blood sugar levels can damage the blood vessels and nerves in your legs and feet  This damage makes it hard to feel pressure, pain, temperature, and touch  You may not be able to feel a cut or sore, or shoes that are too tight  Foot care is needed to prevent serious problems, such as an infection or amputation  · Diabetes may cause your toes to become crooked or curved under  These changes may affect the way you walk and can lead to increased pressure on your foot  The pressure can decrease blood flow to your feet  Lack of blood flow increases your risk for a foot ulcer  Do not ignore small problems, such as dry skin or small wounds  These can become life-threatening over time without proper care  Call your care team provider if:   · Your feet become numb, weak, or hard to move  · You have pus draining from a sore on your foot  · You have a wound on your foot that gets bigger, deeper, or does not heal      · You see blisters, cuts, scratches, calluses, or sores on your foot  · You have a fever, and your feet become red, warm, and swollen  · Your toenails become thick, curled, or yellow  · You find it hard to check your feet because your vision is poor  · You have questions or concerns about your condition or care  How to care for your feet:   · Check your feet each day  Look at your whole foot, including the bottom, and between and under your toes  Check for wounds, corns, and calluses  Use a mirror to see the bottom of your feet  The skin on your feet may be shiny, tight, or darker than normal  Your feet may also be cold and pale  Feel your feet by running your hands along the tops, bottoms, sides, and between your toes   Redness, swelling, and warmth are signs of blood flow problems that can lead to a foot ulcer  Do not try to remove corns or calluses yourself  · Wash your feet each day with soap and warm water  Do not use hot water, because this can injure your foot  Dry your feet gently with a towel after you wash them  Dry between and under your toes  · Apply lotion or a moisturizer on your dry feet  Ask your care team provider what lotions are best to use  Do not put lotion or moisturizer between your toes  Moisture between your toes could lead to skin breakdown  · Cut your toenails correctly  File or cut your toenails straight across  Use a soft brush to clean around your toenails  If your toenails are very thick, you may need to have a care team provider or specialist cut them  · Protect your feet  Do not walk barefoot or wear your shoes without socks  Check your shoes for rocks or other objects that can hurt your feet  Wear cotton socks to help keep your feet dry  Wear socks without toe seams, or wear them with the seams inside out  Change your socks each day  Do not wear socks that are dirty or damp  · Wear shoes that fit well  Wear shoes that do not rub against any area of your feet  Your shoes should be ½ to ¾ inch (1 to 2 centimeters) longer than your feet  Your shoes should also have extra space around the widest part of your feet  Walking or athletic shoes with laces or straps that adjust are best  Ask your care team provider for help to choose shoes that fit you best  Ask him or her if you need to wear an insert, orthotic, or bandage on your feet  · Go to your follow-up visits  Your care team provider will do a foot exam at least once a year  You may need a foot exam more often if you have nerve damage, foot deformities, or ulcers  He will check for nerve damage and how well you can feel your feet  He will check your shoes to see if they fit well  · Do not smoke  Smoking can damage your blood vessels and put you at increased risk for foot ulcers   Ask your care team provider for information if you currently smoke and need help to quit  E-cigarettes or smokeless tobacco still contain nicotine  Talk to your care team provider before you use these products  Follow up with your diabetes care team provider or foot specialist as directed: You will need to have your feet checked at least once a year  You may need a foot exam more often if you have nerve damage, foot deformities, or ulcers  Write down your questions so you remember to ask them during your visits  © Copyright [employer] 2022 Information is for End User's use only and may not be sold, redistributed or otherwise used for commercial purposes  All illustrations and images included in CareNotes® are the copyrighted property of A D A M , Inc  or [facility] Elidia Marquez   The above information is an  only  It is not intended as medical advice for individual conditions or treatments  Talk to your doctor, nurse or pharmacist before following any medical regimen to see if it is safe and effective for you  Plantar Fasciitis Exercises   AMBULATORY CARE:   What you need to know about plantar fasciitis exercises:  Plantar fasciitis exercises help stretch your plantar fascia, calf muscles, and Achilles tendon  They also help strengthen the muscles that support your heel and foot  Exercises and stretching can help prevent plantar fasciitis from getting worse or coming back  Contact your healthcare provider if:   · Your pain and swelling increase  · You develop new knee, hip, or back pain  · You have questions or concerns about your condition or care  How to do plantar fasciitis exercises:  Ask your healthcare provider when to start these exercises and how often to do them  · Slant board stretch:  Stand on a slanted board with your toes higher than your heel  Press your heel into the board  Keep your knee slightly bent  Hold this position for 1 minute  Repeat 5 times           · Heel stretch:  Stand up straight with your hands on a wall  Place your injured leg slightly behind your other leg  Keep your heels flat on the floor, lean forward, and bend both knees  Hold for 30 seconds  · Calf stretch:  Stand up straight with your hands on a wall  Step forward so that your uninjured foot is in front of your injured foot  Keep your front leg bent and your back leg straight  Gently lean forward until you feel your calf stretch  Hold for 30 seconds and then relax  · Seated plantar fascia stretch:  Sit on a firm surface, such as the floor or a mat  Extend your legs out in front of you  Raise your injured foot a few inches off the ground  Keep your leg straight  Grab the toes of your injured foot and pull them toward you  With your other hand, feel your plantar fascia  You should feel it press outward  Hold for 30 seconds  If you cannot reach your toes, loop a towel or tie around your foot  Gently pull on the towel or tie and flex your toes toward you  · Heel raises:  Stand on the injured leg  Raise your other leg off the ground  Hold onto a railing or wall for balance  Slowly rise up on the toes of your injured leg  Hold for 5 seconds  Slowly lower your heel to the ground  · Toe curls:  Place a towel on the floor  Put your foot flat on the towel  Grab the towel with your toes by curling them around the towel  Lift the towel up with your toes  · Toe taps:  Sit down and place your foot flat on the floor  Keep your heel on the floor  Point all your toes up toward the ceiling  While the 4 smaller toes are pointed up, bend your big toe down and tap it on the ground  Do 10 to 50 taps  Point all 5 toes up toward the ceiling again  This time keep your big toe pointed up and tap the 4 smaller toes on the ground  Do 10 to 50 taps each time  Follow up with your doctor as directed:  Write down your questions so you remember to ask them during your visits     © Copyright [employer] 2022 Information is for End User's use only and may not be sold, redistributed or otherwise used for commercial purposes  All illustrations and images included in CareNotes® are the copyrighted property of A D A M , Inc  or Alysa Mcgarry  The above information is an  only  It is not intended as medical advice for individual conditions or treatments  Talk to your doctor, nurse or pharmacist before following any medical regimen to see if it is safe and effective for you

## 2022-04-06 ENCOUNTER — APPOINTMENT (OUTPATIENT)
Dept: LAB | Facility: HOSPITAL | Age: 52
End: 2022-04-06
Payer: MEDICARE

## 2022-04-06 DIAGNOSIS — E11.9 TYPE 2 DIABETES MELLITUS WITHOUT COMPLICATION, UNSPECIFIED WHETHER LONG TERM INSULIN USE (HCC): ICD-10-CM

## 2022-04-06 LAB
ALBUMIN SERPL BCP-MCNC: 3.3 G/DL (ref 3.5–5)
ALP SERPL-CCNC: 120 U/L (ref 46–116)
ALT SERPL W P-5'-P-CCNC: 36 U/L (ref 12–78)
ANION GAP SERPL CALCULATED.3IONS-SCNC: 5 MMOL/L (ref 4–13)
AST SERPL W P-5'-P-CCNC: 15 U/L (ref 5–45)
BASOPHILS # BLD AUTO: 0.04 THOUSANDS/ΜL (ref 0–0.1)
BASOPHILS NFR BLD AUTO: 1 % (ref 0–1)
BILIRUB SERPL-MCNC: 0.54 MG/DL (ref 0.2–1)
BUN SERPL-MCNC: 17 MG/DL (ref 5–25)
CALCIUM ALBUM COR SERPL-MCNC: 9.6 MG/DL (ref 8.3–10.1)
CALCIUM SERPL-MCNC: 9 MG/DL (ref 8.3–10.1)
CHLORIDE SERPL-SCNC: 103 MMOL/L (ref 100–108)
CHOLEST SERPL-MCNC: 108 MG/DL
CO2 SERPL-SCNC: 28 MMOL/L (ref 21–32)
CREAT SERPL-MCNC: 0.87 MG/DL (ref 0.6–1.3)
EOSINOPHIL # BLD AUTO: 0.22 THOUSAND/ΜL (ref 0–0.61)
EOSINOPHIL NFR BLD AUTO: 3 % (ref 0–6)
ERYTHROCYTE [DISTWIDTH] IN BLOOD BY AUTOMATED COUNT: 13.1 % (ref 11.6–15.1)
EST. AVERAGE GLUCOSE BLD GHB EST-MCNC: 289 MG/DL
GFR SERPL CREATININE-BSD FRML MDRD: 99 ML/MIN/1.73SQ M
GLUCOSE P FAST SERPL-MCNC: 261 MG/DL (ref 65–99)
HBA1C MFR BLD: 11.7 %
HCT VFR BLD AUTO: 47.9 % (ref 36.5–49.3)
HDLC SERPL-MCNC: 32 MG/DL
HGB BLD-MCNC: 15.6 G/DL (ref 12–17)
IMM GRANULOCYTES # BLD AUTO: 0.05 THOUSAND/UL (ref 0–0.2)
IMM GRANULOCYTES NFR BLD AUTO: 1 % (ref 0–2)
LDLC SERPL CALC-MCNC: 61 MG/DL (ref 0–100)
LYMPHOCYTES # BLD AUTO: 2.06 THOUSANDS/ΜL (ref 0.6–4.47)
LYMPHOCYTES NFR BLD AUTO: 25 % (ref 14–44)
MCH RBC QN AUTO: 25.5 PG (ref 26.8–34.3)
MCHC RBC AUTO-ENTMCNC: 32.6 G/DL (ref 31.4–37.4)
MCV RBC AUTO: 78 FL (ref 82–98)
MONOCYTES # BLD AUTO: 0.59 THOUSAND/ΜL (ref 0.17–1.22)
MONOCYTES NFR BLD AUTO: 7 % (ref 4–12)
NEUTROPHILS # BLD AUTO: 5.24 THOUSANDS/ΜL (ref 1.85–7.62)
NEUTS SEG NFR BLD AUTO: 63 % (ref 43–75)
NONHDLC SERPL-MCNC: 76 MG/DL
NRBC BLD AUTO-RTO: 0 /100 WBCS
PLATELET # BLD AUTO: 189 THOUSANDS/UL (ref 149–390)
PMV BLD AUTO: 11.4 FL (ref 8.9–12.7)
POTASSIUM SERPL-SCNC: 3.8 MMOL/L (ref 3.5–5.3)
PROT SERPL-MCNC: 7.2 G/DL (ref 6.4–8.2)
RBC # BLD AUTO: 6.11 MILLION/UL (ref 3.88–5.62)
SODIUM SERPL-SCNC: 136 MMOL/L (ref 136–145)
TRIGL SERPL-MCNC: 74 MG/DL
TSH SERPL DL<=0.05 MIU/L-ACNC: 1.76 UIU/ML (ref 0.45–4.5)
WBC # BLD AUTO: 8.2 THOUSAND/UL (ref 4.31–10.16)

## 2022-04-06 PROCEDURE — 80053 COMPREHEN METABOLIC PANEL: CPT

## 2022-04-06 PROCEDURE — 80061 LIPID PANEL: CPT

## 2022-04-06 PROCEDURE — 85025 COMPLETE CBC W/AUTO DIFF WBC: CPT

## 2022-04-06 PROCEDURE — 36415 COLL VENOUS BLD VENIPUNCTURE: CPT

## 2022-04-06 PROCEDURE — 84443 ASSAY THYROID STIM HORMONE: CPT

## 2022-04-06 PROCEDURE — 83036 HEMOGLOBIN GLYCOSYLATED A1C: CPT

## 2022-08-11 ENCOUNTER — OFFICE VISIT (OUTPATIENT)
Dept: DENTISTRY | Facility: CLINIC | Age: 52
End: 2022-08-11

## 2022-08-11 VITALS — HEART RATE: 70 BPM | DIASTOLIC BLOOD PRESSURE: 95 MMHG | SYSTOLIC BLOOD PRESSURE: 155 MMHG | TEMPERATURE: 97.8 F

## 2022-08-11 DIAGNOSIS — Z01.21 ENCOUNTER FOR DENTAL EXAMINATION AND CLEANING WITH ABNORMAL FINDINGS: Primary | ICD-10-CM

## 2022-08-11 PROBLEM — K05.30 PERIODONTITIS: Status: ACTIVE | Noted: 2022-08-11

## 2022-08-11 PROCEDURE — D0150 COMPREHENSIVE ORAL EVALUATION - NEW OR ESTABLISHED PATIENT: HCPCS | Performed by: DENTIST

## 2022-08-11 PROCEDURE — D0210 INTRAORAL - COMPLETE SERIES OF RADIOGRAPHIC IMAGES: HCPCS | Performed by: DENTIST

## 2022-08-11 NOTE — PROGRESS NOTES
Comprehensive Exam    Radha Sepulveda presents for a comprehensive exam  Verbal consent for treatment given in addition to the forms  Reviewed health history - Patient is ASA III, uncontrolled diabetes, most recent HbA1C 11 7  Consents signed: Yes    CC: wants upper right teeth removed and partials made for upper and lower    Perio: severe perio   Stage 3 grabe C  Pain Scale: 3, upper right was hurting but not all the time  Caries Assessment: high  Radiographs: FMX taken and reviewed, tooth #2 and #4 fractured and have apical radiolucency's, 50-70% horizontal bone loss with some vertical bone loss seen  Perio probing completed, see perio chart  EO: wnl  IO: generalized gingivitis, tooth #2,4 crown fractured off and caries present  caries noted: #8-MI, #11-DL incipient watch, #12-DO, watch MOL, #13-MOD, #20-D watch     Discussed with pt the need for extractions #2,4 due to infection and crown fracture and to have tooth #31 extracted as well since there is no opposing teeth  Pt agreed  Let pt know he has bone loss and and will need to go through perio treatment after infection control before restorations and dentures can be made  Pt understands     Treatment Plan:  1  Infection control: extraction tooth #2,4,31  2  Periodontal therapy: SRP all 4 quads, re-eval 4-6 weeks after, possible perio surgery needed  3  Caries control: restoration #12-DO, #13-MOD  4  Occlusal evaluation: upper and lower partials     Antibiotics will need to be given before all SRPS and extractions 3 days before  Prognosis is guarded  Referrals needed: No    Next visit: extraction #2,4 will send antibiotics for pt to start 3 days before extraction date due to uncontrolled diabetes

## 2022-10-14 RX ORDER — AMOXICILLIN 500 MG/1
500 CAPSULE ORAL EVERY 8 HOURS SCHEDULED
Qty: 21 CAPSULE | Refills: 0 | Status: CANCELLED | OUTPATIENT
Start: 2022-10-14 | End: 2022-10-21

## 2022-10-14 RX ORDER — AMOXICILLIN 500 MG/1
500 CAPSULE ORAL EVERY 8 HOURS SCHEDULED
Qty: 21 CAPSULE | Refills: 0 | Status: SHIPPED | OUTPATIENT
Start: 2022-10-14 | End: 2022-10-21

## 2022-10-17 ENCOUNTER — OFFICE VISIT (OUTPATIENT)
Dept: DENTISTRY | Facility: CLINIC | Age: 52
End: 2022-10-17

## 2022-10-17 VITALS — SYSTOLIC BLOOD PRESSURE: 142 MMHG | TEMPERATURE: 96.9 F | DIASTOLIC BLOOD PRESSURE: 88 MMHG | HEART RATE: 79 BPM

## 2022-10-17 DIAGNOSIS — Z01.20 ENCOUNTER FOR DENTAL EXAMINATION AND CLEANING WITHOUT ABNORMAL FINDINGS: Primary | ICD-10-CM

## 2022-10-17 PROCEDURE — D7140 EXTRACTION, ERUPTED TOOTH OR EXPOSED ROOT (ELEVATION AND/OR FORCEPS REMOVAL): HCPCS | Performed by: DENTIST

## 2022-10-17 NOTE — PROGRESS NOTES
Oral Surgery    Universal Protocol    Other Assisting Provider: Yes, atiya (assistant)    Verbal consent obtained? YES  Written consent obtained? YES    Risks, benefits and alternatives discussed?: YES    Consent given by: Patient ()    Time Out  Immediately prior to the procedure a time out was called: YES    Time Out:  9:00    A time out verifies correct patient, procedure, equipment, support staff and site/side marked as required  Patient states understanding of procedure being performed: YES    Patient's understanding of procedure matches consent: YES    Procedure consent matches procedure scheduled: YES    Test results available and properly labeled: N/A    Site  Verified with the patient  YES    Radiology Images displayed and confirmed  If images not available, report reviewed:  YES    Required items - Required blood products, implants, devices and special equipment available: YES    Patient identity confirmed:  YES    Extractions:    Cortney Casey presents for Ext #2,4,31    ASA: III    [facility], patient denies any changes  Obtained a direct and personal consent  Risks and complications were explained  Pt agreed and consented  Consent scanned in doc center  Pre-Op BP WNL  Administered 1 cc of 2 % Lidocaine w/ 1:100,000 epi via block and 2 carps 4% articaine 1:100k epi through Infiltration  ? Adequate anesthesia obtained, reflected gingiva, elevated, and extracted #2,4,31   Socket irrigated, and gauze placed for hemostasis  Upon dismissal, patient received POI, gauze, and RX given to pt Friday 10/14 amox 500mg, pt to finish prescription to completion       NV: SRPs

## 2022-11-21 ENCOUNTER — CONSULT (OUTPATIENT)
Dept: ENDOCRINOLOGY | Facility: CLINIC | Age: 52
End: 2022-11-21

## 2022-11-21 VITALS
HEART RATE: 67 BPM | WEIGHT: 259 LBS | BODY MASS INDEX: 38.81 KG/M2 | DIASTOLIC BLOOD PRESSURE: 86 MMHG | SYSTOLIC BLOOD PRESSURE: 144 MMHG

## 2022-11-21 DIAGNOSIS — E11.65 TYPE 2 DIABETES MELLITUS WITH HYPERGLYCEMIA, WITH LONG-TERM CURRENT USE OF INSULIN (HCC): Primary | ICD-10-CM

## 2022-11-21 DIAGNOSIS — E78.2 MIXED HYPERLIPIDEMIA: ICD-10-CM

## 2022-11-21 DIAGNOSIS — Z79.4 TYPE 2 DIABETES MELLITUS WITH HYPERGLYCEMIA, WITH LONG-TERM CURRENT USE OF INSULIN (HCC): Primary | ICD-10-CM

## 2022-11-21 DIAGNOSIS — I10 ESSENTIAL HYPERTENSION: ICD-10-CM

## 2022-11-21 RX ORDER — FLASH GLUCOSE SCANNING READER
EACH MISCELLANEOUS
Qty: 1 EACH | Refills: 0 | Status: SHIPPED | OUTPATIENT
Start: 2022-11-21

## 2022-11-21 RX ORDER — SEMAGLUTIDE 1.34 MG/ML
INJECTION, SOLUTION SUBCUTANEOUS
Qty: 1.5 ML | Refills: 4 | Status: SHIPPED | OUTPATIENT
Start: 2022-11-21

## 2022-11-21 RX ORDER — FLASH GLUCOSE SENSOR
KIT MISCELLANEOUS
Qty: 1 EACH | Refills: 0 | Status: SHIPPED | OUTPATIENT
Start: 2022-11-21

## 2022-11-21 NOTE — PROGRESS NOTES
Nimisha Reynolds 47 y o  male MRN: [id number]    Encounter: [id number]      Assessment/Plan     Assessment: This is a 47y o -year-old male with PMH of type 2 Diabetes Mellitus, sHTN, moderate aortic stenosis, asthma, active tobacco user, pulmonary nodules who presents to endocrine clinic for consultation for type 2 diabetes mellitus     Plan:  Type 2 diabetes mellitus   HbA1c: 11 7 April 2022 (Hb: 15 6, MCV: 78)  Discussed w/ the patient about adding mealtime insulin, however, pt was reluctant about it  Discussed about diabetes educator referral, will refer today as pt agrees  Discussed about using GLP-1 agonists such as ozempic, started on Ozempic 0 25 mg once weekly for 4 weeks and if tolerated well to increase to 0 5 mg once weekly thereafter  Discussed about side effects of nausea, vomiting, abdominal pain that could be due to pancreatitis and recommended the patient to go to emergency room if that occurs  Increase metformin to 1000 mg b i d  From 500 mg b i d  Increase Farxiga to 10 mg daily from 5 mg daily  Continue with Lantus 32 units in a m  Follow-up CMP, HbA1c at next visit in 3 months  Sent for quietrevolution 2 sensor and reader to the pharmacy  Also recommended to measure blood glucose before breakfast and before dinner and  send blood glucose logs to the office for review in 2 weeks  Discuss about fasting blood glucose close goal of  and blood glucose goal during the day of     Hepatic Steatosis  Discussed about importance of weight loss to assist with diabetes and hepatic steatosis management  Recommended to make dietary modifications and exercise for 150 minutes per week  Will also start on Ozempic  0 25 mg once weekly for 4 weeks and if tolerated well to increase to 0 5 mg once weekly thereafter      Hyperlipidemia  Lipid panel from April 2022 noted  Continue with atorvastatin 20 mg daily    Hypertension  Continue with lisinopril-HCTZ 20 mg-25 mg daily    CC: Diabetes    History of Present Illness     HPI:  This is a 47y o -year-old male with PMH of type 2 Diabetes Mellitus, sHTN, moderate aortic stenosis, asthma, active tobacco user, pulmonary nodules who presents to endocrine clinic for consultation for type 2 diabetes mellitus     Pt reports to be diagnosed with diabetes about 1 year ago, with sx of polyuria and through blood work  Pt reports to have been on insulin therapy for 1 year  Patient reports that at the time of diagnosis his HbA1c was 14 8, was started on basal bolus regimen and his A1c came down to 8  However patient was getting frustrated with the basal bolus regimen and had requested for oral agents and repeat A1c was 11 7 which prompted him to see endocrinology    Denies any complications of retinopathy, neuropathy, nephropathy, CAD, CVA, claudication, blurry vision, numbness or tingling  Pt reports to have changes with weight, which goes up and down, usually ranges 257-262lbs    Reports to have polyuria, uses restroom about 6-7 times  Denies polyphagia or polydipsia or nocturia  No hospitalizations or ED visits for hypo/hyperglycemia  Reports to have sx of hypoglycemia such as hunger and dizziness daily around 2-3pm but does not measure BG, states that he drinks water    Blood glucose monitoring:   Uses glucometer, measures it once every 2 weeks  Measures it post prandially, 130s-160s    Current regimen:  Farxiga 5mg, glipizide 10 mg w/ lunch, metformin 500mg BID, Lantus 32 U every AM    Diet: uses equal with coffee, avoids beverages with sugar and carbs  Breakfast: scrambled eggs, 2 toast, pancakes and waffle  Lunch: sandwich or peanut butter with crackers  Dinner: spaghetti or soup    Exercise: Walks with dog 45 mins  Diabetes educator: no    Optho:  Oct 2022  Podiatry: none    sHTN: lisinopril-hctz 20-25mg  HLD: atorvastatin 20mg    No personal or family hx pancreatitis, MEN 2A/2B syndrome, medullary thyroid cancer    Review of Systems   Constitutional: Negative for activity change, appetite change and fatigue  HENT: Negative for congestion and sore throat  Eyes: Negative for redness and visual disturbance  Respiratory: Negative for cough and shortness of breath  Cardiovascular: Negative for palpitations and leg swelling  Gastrointestinal: Negative for abdominal pain, constipation, diarrhea, nausea and vomiting  Endocrine: Negative for cold intolerance, heat intolerance, polydipsia, polyphagia and polyuria  Genitourinary: Negative for difficulty urinating and dysuria  Musculoskeletal: Negative for gait problem and neck pain  Skin: Negative for color change  Neurological: Negative for dizziness and syncope  Psychiatric/Behavioral: Negative for agitation and behavioral problems  All other systems reviewed and are negative  Historical Information   Past Medical History:   Diagnosis Date   • Arthritis     back and leg pain   • Asthma    • Diabetes [facility])    • Ear problems      Past Surgical History:   Procedure Laterality Date   • BACK SURGERY     • KNEE SURGERY Left 01/23/2019     Social History   Social History     Substance and Sexual Activity   Alcohol Use Yes     Social History     Substance and Sexual Activity   Drug Use No     Social History     Tobacco Use   Smoking Status Every Day   • Types: Cigarettes   Smokeless Tobacco Never     Family History:   Family History   Problem Relation Age of Onset   • Diabetes Father        Meds/Allergies   Current Outpatient Medications   Medication Sig Dispense Refill   • albuterol (PROVENTIL HFA,VENTOLIN HFA) 90 mcg/act inhaler Inhale 2 puffs every 6 (six) hours     • amLODIPine (NORVASC) 10 mg tablet TAKE 1 TABLET(S) EVERY DAY BY ORAL ROUTE FOR 90 DAYS  • aspirin (ECOTRIN LOW STRENGTH) 81 mg EC tablet TAKE 1 TABLET(S) EVERY DAY BY ORAL ROUTE FOR 90 DAYS  • atorvastatin (LIPITOR) 20 mg tablet TAKE 1 TABLET(S) EVERY DAY BY ORAL ROUTE FOR 90 DAYS   CHOLESTEROL PILL     • B-D UF III MINI PEN NEEDLES 31G X 5 MM MISC USE AS DIRECTED WITH INSULINE     • baclofen 10 mg tablet Take 10 mg by mouth Daily for 6 days     • docusate sodium (COLACE) 100 mg capsule as needed     • DULoxetine (CYMBALTA) 60 mg delayed release capsule Take 60 mg by mouth daily      • Farxiga 5 MG TABS TAKE 1 TABLET EVERY DAY BY ORAL ROUTE FOR 30 DAYS  • fluticasone-salmeterol (Advair) 500-50 mcg/dose inhaler INHALE 1 PUFF(S) TWICE A DAY BY INHALATION ROUTE     • gabapentin (NEURONTIN) 300 mg capsule TAKE 1 CAPSULE 3 TIMES A DAY BY ORAL ROUTE FOR 90 DAYS  • glipiZIDE (GLUCOTROL) 10 mg tablet TAKE 1 TABLET EVERY DAY BY ORAL ROUTE  • hydrochlorothiazide (HYDRODIURIL) 25 mg tablet TAKE 1 TABLET(S) EVERY DAY BY ORAL ROUTE FOR 90 DAYS  • Lantus SoloStar 100 units/mL injection pen INJECT 26 UNITS EVERY DAY BY SUBCUTANEOUS ROUTE IN THE MORNING FOR 30 DAYS  • lisinopril-hydrochlorothiazide (PRINZIDE,ZESTORETIC) 20-25 MG per tablet Take 1 tablet by mouth daily       • metFORMIN (GLUCOPHAGE) 1000 MG tablet TAKE 1 TABLET(S) TWICE A DAY BY ORAL ROUTE FOR 90 DAYS  • Pharmacist Choice Lancets MISC USE AS DIRECTED  TEST EVERY DAY PER MD     • polyethylene glycol (GLYCOLAX) powder as needed     • SUBOXONE 12-3 MG FILM TAKE 1 FILM SUBLINGUALY WITH 2MG STRIPS TOTAL 14MG DAILY  0   • SUBOXONE 2-0 5 MG TAKE 1 FILM SUBLINGUALY EVERY DAY TOTAL 14MG DAILY  0   • traZODone (DESYREL) 100 mg tablet daily at bedtime as needed       No current facility-administered medications for this visit  No Known Allergies    Objective   Vitals: Blood pressure 144/86, pulse 67, weight 117 kg (259 lb)  Physical Exam  Constitutional:       Appearance: Normal appearance  HENT:      Head: Normocephalic and atraumatic  Cardiovascular:      Rate and Rhythm: Normal rate and regular rhythm  Pulses: Normal pulses  no weak pulses          Dorsalis pedis pulses are 2+ on the right side and 2+ on the left side          Posterior tibial pulses are 2+ on the right side and 2+ on the left side  Heart sounds: Normal heart sounds  No murmur heard  No gallop  Pulmonary:      Effort: Pulmonary effort is normal       Breath sounds: Normal breath sounds  No wheezing or rales  Abdominal:      Tenderness: There is no abdominal tenderness  Musculoskeletal:         General: No swelling  Cervical back: Normal range of motion and neck supple  Right lower leg: No edema  Left lower leg: No edema  Feet:      Right foot:      Skin integrity: No ulcer, skin breakdown, erythema, warmth, callus or dry skin  Left foot:      Skin integrity: No ulcer, skin breakdown, erythema, warmth, callus or dry skin  Skin:     General: Skin is warm  Neurological:      Mental Status: He is alert and oriented to person, place, and time  Mental status is at baseline  Psychiatric:         Mood and Affect: Mood normal          Behavior: Behavior normal        Diabetic Foot Exam    Patient's shoes and socks removed  Right Foot/Ankle   Right Foot Inspection  Skin Exam: skin normal and skin intact  No dry skin, no warmth, no callus, no erythema, no maceration, no abnormal color, no pre-ulcer, no ulcer and no callus  Toe Exam: No swelling, erythema and  no right toe deformity    Sensory   Vibration: intact  Proprioception: intact  Monofilament testing: intact    Vascular  The right DP pulse is 2+  The right PT pulse is 2+  Left Foot/Ankle  Left Foot Inspection  Skin Exam: skin normal and skin intact  No dry skin, no warmth, no erythema, no maceration, normal color, no pre-ulcer, no ulcer and no callus  Toe Exam: No swelling, no erythema and no left toe deformity  Sensory   Vibration: intact  Proprioception: intact  Monofilament testing: intact    Vascular  The left DP pulse is 2+  The left PT pulse is 2+       Assign Risk Category  No deformity present  No loss of protective sensation  No weak pulses  Risk: 0       The history was obtained from the review of the chart, patient      Lab Results:   Lab Results   Component Value Date/Time    Hemoglobin A1C 11 7 (H) 04/06/2022 06:54 AM    WBC 8 20 04/06/2022 06:54 AM    Hemoglobin 15 6 04/06/2022 06:54 AM    Hematocrit 47 9 04/06/2022 06:54 AM    MCV 78 (L) 04/06/2022 06:54 AM    Platelets 786 46/95/9726 06:54 AM    BUN 17 04/06/2022 06:54 AM    Potassium 3 8 04/06/2022 06:54 AM    Chloride 103 04/06/2022 06:54 AM    CO2 28 04/06/2022 06:54 AM    Creatinine 0 87 04/06/2022 06:54 AM    AST 15 04/06/2022 06:54 AM    ALT 36 04/06/2022 06:54 AM    Albumin 3 3 (L) 04/06/2022 06:54 AM    HDL, Direct 32 (L) 04/06/2022 06:54 AM    Triglycerides 74 04/06/2022 06:54 AM       Component      Latest Ref Rng & Units 4/6/2022   WBC      4 31 - 10 16 Thousand/uL 8 20   Red Blood Cell Count      3 88 - 5 62 Million/uL 6 11 (H)   Hemoglobin      12 0 - 17 0 g/dL 15 6   HCT      36 5 - 49 3 % 47 9   MCV      82 - 98 fL 78 (L)   MCH      26 8 - 34 3 pg 25 5 (L)   MCHC      31 4 - 37 4 g/dL 32 6   RDW      11 6 - 15 1 % 13 1   MPV      8 9 - 12 7 fL 11 4   Platelet Count      655 - 390 Thousands/uL 189   nRBC      /100 WBCs 0   Neutrophils %      43 - 75 % 63   Immat GRANS %      0 - 2 % 1   Lymphocytes Relative      14 - 44 % 25   Monocytes Relative      4 - 12 % 7   Eosinophils      0 - 6 % 3   Basophils Relative      0 - 1 % 1   Absolute Neutrophils      1 85 - 7 62 Thousands/µL 5 24   Immature Grans Absolute      0 00 - 0 20 Thousand/uL 0 05   Lymphocytes Absolute      0 60 - 4 47 Thousands/µL 2 06   Absolute Monocytes      0 17 - 1 22 Thousand/µL 0 59   Absolute Eosinophils      0 00 - 0 61 Thousand/µL 0 22   Basophils Absolute      0 00 - 0 10 Thousands/µL 0 04   Sodium      136 - 145 mmol/L 136   Potassium      3 5 - 5 3 mmol/L 3 8   Chloride      100 - 108 mmol/L 103   CO2      21 - 32 mmol/L 28   Anion Gap      4 - 13 mmol/L 5   BUN      5 - 25 mg/dL 17   Creatinine      0 60 - 1 30 mg/dL 0 87   GLUCOSE FASTING      65 - 99 mg/dL 261 (H)   Calcium      8 3 - 10 1 mg/dL 9 0   CORRECTED CALCIUM      8 3 - 10 1 mg/dL 9 6   AST      5 - 45 U/L 15   ALT      12 - 78 U/L 36   Alkaline Phosphatase      46 - 116 U/L 120 (H)   Total Protein      6 4 - 8 2 g/dL 7 2   Albumin      3 5 - 5 0 g/dL 3 3 (L)   TOTAL BILIRUBIN      0 20 - 1 00 mg/dL 0 54   eGFR      ml/min/1 73sq m 99   Cholesterol      See Comment mg/dL 108   Triglycerides      See Comment mg/dL 74   HDL      >=40 mg/dL 32 (L)   LDL Calculated      0 - 100 mg/dL 61   Non-HDL Cholesterol      mg/dl 76   Hemoglobin A1C      Normal 3 8-5 6%; PreDiabetic 5 7-6 4%; Diabetic >=6 5%; Glycemic control for adults with diabetes <7 0% % 11 7 (H)   eAG, EST AVG Glucose      mg/dl 289   TSH 3RD GENERATON      0 450 - 4 500 uIU/mL 1 760       Imaging Studies: I have personally reviewed pertinent reports  Portions of the record may have been created with voice recognition software  Occasional wrong word or "sound a like" substitutions may have occurred due to the inherent limitations of voice recognition software  Read the chart carefully and recognize, using context, where substitutions have occurred

## 2022-11-21 NOTE — PATIENT INSTRUCTIONS
Check blood glucose before breakfast and before dinner  Target blood glucose before breakfast is  and during the day is   Please send blood glucose to the office in 2 weeks    Hypoglycemia in a Person with Diabetes   WHAT YOU NEED TO KNOW:   Hypoglycemia is a serious condition that happens when your blood glucose (sugar) level drops too low  The blood sugar level is usually too high in a person with diabetes, but the level can also drop too low  It is important to follow your diabetes management plan to keep your blood sugar level steady  DISCHARGE INSTRUCTIONS:   Have someone call your local emergency number (911 in the [address]) if:   You have a seizure or pass out  Your blood sugar is less than 50 mg/dL and does not respond to treatment  You feel you are going to pass out  You have trouble thinking clearly  Call your doctor or diabetes care team if:   You have had symptoms of low blood sugar several times  You have questions about the amount of insulin or diabetes medicine you are taking  You have questions or concerns about your condition or care  Medicines:   Insulin or diabetes medicine  help to keep your blood sugar under control  Glucagon  may be needed if you have severe hypoglycemia  Take your medicine as directed  Contact your healthcare provider if you think your medicine is not helping or if you have side effects  Tell him or her if you are allergic to any medicine  Keep a list of the medicines, vitamins, and herbs you take  Include the amounts, and when and why you take them  Bring the list or the pill bottles to follow-up visits  Carry your medicine list with you in case of an emergency  Manage hypoglycemia:   Check your blood sugar level right away if you have symptoms of hypoglycemia  Hypoglycemia usually happens when your blood sugar level is 70 mg/dL or below  Ask your diabetes care team what blood sugar level is too low for you      If your blood sugar level is too low, eat or drink 15 grams of fast-acting carbohydrate  Examples of this amount of fast-acting carbohydrate are 4 ounces (½ cup) of fruit juice or 4 ounces of regular soda  Other examples are 2 tablespoons of raisins or 1 tube of glucose gel  Check your blood sugar level 15 minutes later  Sit still as you wait  If the level is still low (less than 100 mg/dL), have another 15 grams of carbohydrate  When the level returns to 100 mg/dL, eat a meal if it is time  If your meal time is more than 1 hour away, eat a snack  The snack should contain carbohydrates, such as the following:    3/4 cup of cereal    1 cup of skim or low fat milk    6 soda crackers    1/2 of a turkey sandwich    15 fat-free chips  This will help prevent another drop in blood sugar  Always carefully follow your diabetes care team's instructions on how to treat low blood sugar levels  Always carry a source of fast-acting carbohydrate  If you have symptoms of hypoglycemia and you do not have a blood glucose meter, have a source of fast-acting carbohydrate anyway  Avoid carbohydrate foods that are high in fat  The fat content may make the carbohydrate take longer to increase your blood sugar level  Ask your diabetes care team if you should carry a glucagon kit  Glucagon is a medicine that is injected when you develop severe hypoglycemia and become unconscious  Check the expiration date every month and replace it before it expires  Teach others how to help you if you have symptoms of hypoglycemia  Tell them about the symptoms of hypoglycemia  Ask them to give you a source of fast-acting carbohydrate if you cannot get it yourself  Ask them to give you a glucagon injection if you have signs of hypoglycemia and you become unconscious or have a seizure  Ask them to call the local emergency number (911 in the [address])   This is an emergency  Tell them never to try to make you swallow anything if you faint or have a seizure      Wear medical alert jewelry  or carry a card that says you have diabetes  Ask where to get these items  Prevent hypoglycemia:   Take diabetes medicine as directed  Take your medicine at the right time and in the right amount  Do not  double the amount of medicine you take unless instructed by your diabetes care team  Violet Gillis may need oral diabetes medicine, insulin, or both to help control your blood sugar levels  Your healthcare provider will teach you how and when to take oral diabetes medicine  You will also be taught about side effects oral diabetes medicine can cause  Insulin may be added if oral diabetes medicine becomes less effective over time  Insulin may be injected, or given through a pump or pen  You and your care team will discuss which method is best for you  An insulin pump  is an implanted device that gives your insulin 24 hours a day  An insulin pump prevents the need for multiple insulin injections in a day  An insulin pen  is a device prefilled with the right amount of insulin  Eat meals and snacks as directed  Talk to your dietitian or diabetes care team about a meal plan that is right for you  Do not skip meals  Check your blood sugar level as directed  Ask your diabetes care team what your blood sugar levels should be before and after you eat  Ask when and how often to check your blood sugar level  You may need to check a drop of blood in a glucose test machine  You may need to check at least 3 times each day  Record your blood sugar level results and take the record with you when you see your care team  They may use it to make changes to your medicine, food, or exercise schedules  Your care team provider may recommend a continuous glucose monitor (CGM)  A CGM is a device that is worn at all times  The CGM checks your blood sugar every 5 minutes  It sends results to an electronic device such as a smart phone  Check your blood sugar level before you exercise    Physical activity, such as exercise, can decrease your blood sugar level  If your blood sugar level is less than 100 mg/dL, have a carbohydrate snack  Examples are 4 to 6 crackers, ½ banana, 8 ounces (1 cup) of nonfat or 1% milk, or 4 ounces (½ cup) of juice  If you will be active for more than 1 hour, you may need to check your blood sugar level every 30 minutes  Your diabetes care team may also recommend that you check your blood sugar level after your activity  Know the risks if you choose to drink alcohol  Alcohol can cause your blood sugar levels to be low if you use insulin  Alcohol can cause high blood sugar levels and weight gain if you drink too much  Women 21 years or older and men 72 years or older should limit alcohol to 1 drink a day  Men aged 24 to 59 years should limit alcohol to 2 drinks a day  A drink of alcohol is 12 ounces of beer, 5 ounces of wine, or 1½ ounces of liquor  Follow up with your doctor or diabetes care team or specialist as directed: You may need your insulin or diabetes medicine changed if you continue to have hypoglycemia episodes  Write down your questions so you remember to ask them during your visits  © Copyright [employer] 2022 Information is for End User's use only and may not be sold, redistributed or otherwise used for commercial purposes  All illustrations and images included in CareNotes® are the copyrighted property of [employer]  or Alysa Marquez   The above information is an  only  It is not intended as medical advice for individual conditions or treatments  Talk to your doctor, nurse or pharmacist before following any medical regimen to see if it is safe and effective for you

## 2022-12-19 ENCOUNTER — OFFICE VISIT (OUTPATIENT)
Dept: DENTISTRY | Facility: CLINIC | Age: 52
End: 2022-12-19

## 2022-12-19 VITALS — DIASTOLIC BLOOD PRESSURE: 85 MMHG | SYSTOLIC BLOOD PRESSURE: 128 MMHG

## 2022-12-19 DIAGNOSIS — K05.4 PERIODONTOSIS: Primary | ICD-10-CM

## 2022-12-19 PROBLEM — E11.9 DIABETES (HCC): Status: ACTIVE | Noted: 2022-12-19

## 2022-12-19 PROBLEM — S83.242A OTHER TEAR OF MEDIAL MENISCUS, CURRENT INJURY, LEFT KNEE, INITIAL ENCOUNTER: Status: ACTIVE | Noted: 2019-01-24

## 2022-12-19 NOTE — PROGRESS NOTES
Reviewed Medical History and discussed with DR WATKINS  If pt  Needed to take antibiotic three days prior to SRP apt as noted by previous Resident DR Dr Guzman Lesser no if sugar numbers ok  Pt states his monitor at 4am read 187  Dr Cedric Ormond told me ok to proceed with SRP without use of antibiotic  ASA II  CC: was not aware procedure being done today  I drew picture explaining perio condition and procedure  Pt nervous but gave his consent to proceed  Scaling and Root Planing: UR  Applied topical gel  Administered 1 carpules 1 7mL Infiltrated  Short Septocaine, negative aspiration  Pt tolerated well  Pt  tolerated well  Hand and ultrasonic Scaled and root planned  Post subgingival irrigation with Chlorhexidine  Heavy generalized bleeding  Pt is taking aspirin daily 81 mg   Recommended to consider quitting smoking habit, to use Listerine, electric tb and floss  Pt left satisfied and in good health  Pt advised against heavy lifting and caution hot foods, drinks or chewing until mouth is back to normal due to local anesthesia  NV: SRP LR-check sugar numbers next visit  If over 300, will need to take ab  Prior to trt as per Dr Cedric Ormond   Needs: Dennis Garcia, [facility] , [facility]

## 2022-12-21 ENCOUNTER — OFFICE VISIT (OUTPATIENT)
Dept: DIABETES SERVICES | Facility: CLINIC | Age: 52
End: 2022-12-21

## 2022-12-21 VITALS — BODY MASS INDEX: 38.96 KG/M2 | WEIGHT: 260 LBS

## 2022-12-21 DIAGNOSIS — E11.65 TYPE 2 DIABETES MELLITUS WITH HYPERGLYCEMIA, WITH LONG-TERM CURRENT USE OF INSULIN (HCC): Primary | ICD-10-CM

## 2022-12-21 DIAGNOSIS — Z79.4 TYPE 2 DIABETES MELLITUS WITH HYPERGLYCEMIA, WITH LONG-TERM CURRENT USE OF INSULIN (HCC): Primary | ICD-10-CM

## 2022-12-21 NOTE — PROGRESS NOTES
Medical Nutrition Therapy      Assessment    Visit Type: Initial visit  Chief complaint/Medical Diagnosis/reason for visit: E11 65, Z79 4 (ICD-10-CM) - Type 2 diabetes mellitus with hyperglycemia, with long-term current use of insulin ([facility] [address] )    HPI Wilhelm Osgood was seen today unaccompanied regarding type 2 diabetes  Jeri Sullivan reports he had not slept well last night and was dozing off several times throughout our visit  Jeri Sullivan reports of a strong family history of diabetes  States he was diagnosed with diabetes approximately 1 year ago  He is currently taking 10 mg of farxiga, 10 mg of glipizide, 32 units of Lantus daily, 1,000 mg of metformin BID and Ozempic once weekly  He does admit to missing some doses of his medications as he states he does not like having to take so much medicine  He recently started using the Amos 2  Assisted patient in setting his high and low alerts and corrected the time and date on his Amos 2 to represent accurate timing of his glucose readings  Recent blood sugars range form 135 to 283 mg/dL  Denies hypoglycemia  Problems identified in food recall include skipping meals, inconsistent carbohydrate intake and little intake of fruits and vegetables  Explained basic pathophysiology of diabetes and impact of diet on blood glucose levels  Together we discussed what foods contain carbohydrates, reading a food label, timing of meals and snacks, serving sizes, the role of fiber, the importance of consistent carbohydrate intake in the appropriate amounts  Used the portion booklet to teach Jeri Sullivan more about food groups and basic carbohydrate counting  Violette Tidwell to establish a more regular meal pattern with 3 regular meals ~4-5 hours apart and 1-2 snack per day as needed  Discussed limiting carbohydrates to 45-60 grams per meal and 0-15 grams of carbohydrate per snack  Jeri Sullivan denied any questions at the conclusion of our visit  RD will remain available for questions      Ht Readings from Last 1 Encounters:   04/05/22 5' 8 5" (1 74 m)     Wt Readings from Last 3 Encounters:   12/21/22 118 kg (260 lb)   11/21/22 117 kg (259 lb)   04/05/22 117 kg (258 lb)     Weight Change: No - weight relatively stable since April per EMR review  Barriers to Learning: no barriers    Do you follow any special diet presently?: No  Who shops: spouse  Who cooks: spouse    Food Log: Completed via the method of usual daily food recall    Breakfast: sometimes just has coffee with cream and equal, had a scrambled egg for breakfast this morning, may go to [facility] and get a scrambled egg bowl and coffee with cream and equal  Morning Snack: none  Lunch: does not eat a lunch meal - typically grazes in the afternoon on peanuts and/or raisins   Afternoon Snack: none  Dinner: sometimes skips, may have rice and beans or soup or pizza or a cheesesteak  Evening Snack: sweet tooth at night, cookies  Beverages: water (but not a lot), coffee with cream and equal, zero sugar soda  Exercise: takes the dog for a walk most mornings, does report physical activity is limited due to issues with his back and his knee  Comments: very little fruit and vegetable intake    Estimated calorie needs: 1,900 kcals/day   Carbohydrate: 45-60 g/meal, 0-15 g/snack       Fat: 5 servings/day      Protein: 8 ounces/day    Nutrition Diagnosis:  Inconsistent carbohydrate intake related to food and nutrition related knowledge deficit concerning appropriate amount and timing of carbohydrate intake as evidenced by estimated carbohydrate intake that is different from recommended types or ingested on an irregular basis      Intervention: label reading, behavior modification strategies, carbohydrate counting, meal timing, meal planning, monitoring portion control and exercise guidelines     Treatment Goals: Patient understands education and recommendations, Patient will consume 3 meals a day, Patient will monitor portion control, Patient will count carbohydrates, Patient will exercise and Patient will monitor blood glucose    Monitoring and evaluation:    Term code indicator  FH 4 4 Mealtime Behavior Criteria: Eat 3 regular meals approximately 4-5 hours apart and 1-2 snacks per day as needed  Term code indicator  FH 1 6 3 Carbohydrate Intake Criteria: Aim for 45-60 grams of carbohydrate per meal and 0-15 grams of carbohydrate per snack  Materials Provided: Portion booklet & "Know Your Numbers" handout    Patient’s Response to Instruction:  Comprehension: fair  Motivation: fair  Expected Compliance: fair    Start- Stop: 8:09-8:54  Total Minutes: 45 Minutes  Group or Individual Instruction: MNT-I  Other: Tang Grewal MD    Thank you for coming to the [place] [id number] for education today  Please feel free to call with any questions or concerns      Susan Dow [address], [address], [address] [address]

## 2022-12-21 NOTE — PATIENT INSTRUCTIONS
Eat 3 regular meals approximately 4-5 hours apart and 1-2 snacks per day as needed  Aim for 45-60 grams of carbohydrate per meal and 0-15 grams of carbohydrate per snack

## 2022-12-27 ENCOUNTER — HOSPITAL ENCOUNTER (OUTPATIENT)
Dept: RADIOLOGY | Facility: HOSPITAL | Age: 52
Discharge: HOME | End: 2022-12-27
Attending: STUDENT IN AN ORGANIZED HEALTH CARE EDUCATION/TRAINING PROGRAM

## 2022-12-27 DIAGNOSIS — Z12.2 SCREENING FOR MALIGNANT NEOPLASM OF RESPIRATORY ORGAN: ICD-10-CM

## 2023-01-06 DIAGNOSIS — E11.65 TYPE 2 DIABETES MELLITUS WITH HYPERGLYCEMIA, WITH LONG-TERM CURRENT USE OF INSULIN (HCC): ICD-10-CM

## 2023-01-06 DIAGNOSIS — Z79.4 TYPE 2 DIABETES MELLITUS WITH HYPERGLYCEMIA, WITH LONG-TERM CURRENT USE OF INSULIN (HCC): ICD-10-CM

## 2023-01-06 RX ORDER — FLASH GLUCOSE SENSOR
KIT MISCELLANEOUS
Qty: 1 EACH | Refills: 0 | Status: SHIPPED | OUTPATIENT
Start: 2023-01-06

## 2023-02-02 ENCOUNTER — OFFICE VISIT (OUTPATIENT)
Dept: DENTISTRY | Facility: CLINIC | Age: 53
End: 2023-02-02

## 2023-02-02 VITALS — SYSTOLIC BLOOD PRESSURE: 143 MMHG | DIASTOLIC BLOOD PRESSURE: 91 MMHG | TEMPERATURE: 97.8 F | HEART RATE: 75 BPM

## 2023-02-02 DIAGNOSIS — E11.630 PERIODONTAL DISEASE DUE TO TYPE 2 DIABETES MELLITUS (HCC): Primary | ICD-10-CM

## 2023-02-02 RX ORDER — PERPHENAZINE 16 MG/1
TABLET, FILM COATED ORAL
COMMUNITY
Start: 2022-11-15

## 2023-02-02 RX ORDER — BUPRENORPHINE AND NALOXONE 4; 1 MG/1; MG/1
FILM, SOLUBLE BUCCAL; SUBLINGUAL
COMMUNITY
Start: 2022-12-15

## 2023-02-02 RX ORDER — ISOPROPYL ALCOHOL 0.7 ML/ML
SWAB TOPICAL
COMMUNITY
Start: 2022-11-15

## 2023-02-02 RX ORDER — BLOOD-GLUCOSE METER
EACH MISCELLANEOUS
COMMUNITY
Start: 2022-11-15

## 2023-02-02 NOTE — PROGRESS NOTES
Reviewed hhx with patient and used the blood sugar glucose test strip monitoring kit with a 313 reading  Dr WATKINS advised patient to go to pharmacy and  antibiotics and take them following today's appt  ASA: III  Pain-0/10  Pts CC-Nothing at this time  Patient presents for SRP in quads :LR    Anesthesia-  Topical gel benzocaine 20% was applied to tissue  1 carp of 4% Septocaine with Epi 1:000,000 was given via short needle  Type of injection:Infiltrations  Neg aspirations and no complications for all  Cavitron and hand instruments used  Bleeding: Slight  Supra/sub calculus was removed from tooth surfaces    OHI: reviewed with the patient the need to maintain proper oral hygiene regimen at home  Brushing 2x/day for 2 minutes, flossing subgingivally daily and mouthrinse 1-2x/day for 60 seconds  Advised patient periodontal disease is an oral disease we can treat and maintain but cannot cure  Without proper dental visits and proper at home dental care this disease may return  Patient understands  NV: SRP UL/LL Patient was instructed to call us 4 days prior to this appt and we will call in a script for antibiotics to be taken 3 days prior to   Per Dr WATKINS  NV2:Assessment following SRPs in all [facility]

## 2023-02-09 ENCOUNTER — OFFICE VISIT (OUTPATIENT)
Dept: DENTISTRY | Facility: CLINIC | Age: 53
End: 2023-02-09

## 2023-02-09 VITALS — HEART RATE: 69 BPM | DIASTOLIC BLOOD PRESSURE: 94 MMHG | SYSTOLIC BLOOD PRESSURE: 154 MMHG | TEMPERATURE: 98.9 F

## 2023-02-09 DIAGNOSIS — Z01.21 ENCOUNTER FOR DENTAL EXAMINATION AND CLEANING WITH ABNORMAL FINDINGS: Primary | ICD-10-CM

## 2023-02-09 NOTE — DENTAL PROCEDURE DETAILS
SRP UL     Reviewed   medical history  Confirmed with patient that he took antibiotics 4 days priot to [facility] appt  Due to ongoing uncontrolled glucose test results Per Dr WATKINS   ASA: III  Pain - 0/10  Pts CC - Patient was wondering when his new RPD's would be fabricated  Hygienist explained that caries control would be done first      Patient presents for SRP in quads :UL    Anesthesia-  Topical gel benzocaine 20% was applied to tissue  Septocaine with epi 4% 1:100,000   1 carp was given via short needle  Type of injection:Infiltrations  Neg aspirations and no complications for all  Cavitron and hand instruments used  Bleeding: Heavy  Supra/sub calculus was removed from tooth surfaces    OHI: reviewed with the patient the need to maintain proper oral hygiene regimen at home  Brushing 2x/day for 2 minutes, flossing subgingivally daily and mouthrinse 1-2x/day for 60 seconds  Advised patient periodontal disease is an oral disease we can treat and maintain but cannot cure  Without proper dental visits and proper at home dental care this disease may return  Patient understands  Following scaling and root planing, pt will return for 4-6 week perio re-eval  If healing is sufficient, the patient will then continue on 3 month periodontal maintenance appointments  If healing is insufficient, pt may need to be assessed for gingival flap surgery  Pt dismissed in good health, no complications and all questions answered  Pt informed of anesthetic lasting a couple hours after procedure and to be careful eating/chewing until anesthetic has worn off       NV: SRP [facility]  NV2:Assessment post SRPs

## 2023-02-20 ENCOUNTER — OFFICE VISIT (OUTPATIENT)
Dept: DENTISTRY | Facility: CLINIC | Age: 53
End: 2023-02-20

## 2023-02-20 VITALS — DIASTOLIC BLOOD PRESSURE: 90 MMHG | HEART RATE: 78 BPM | SYSTOLIC BLOOD PRESSURE: 152 MMHG

## 2023-02-20 DIAGNOSIS — K05.6 PERIODONTAL DISEASE: Primary | ICD-10-CM

## 2023-02-20 NOTE — PROGRESS NOTES
SCALING AND ROOT PLANING     ASA:  II  Pain:  None  Reviewed M/H    SC/RP:   LL  Quad scaling and root planning  Applied Topical Anesthetic,   Administered  1 carpule , Short Needle,  Septocaine (articaine HCI ) and Epi 4 % and  1:100,000 1 7 mL, Infiltration  Type of Treatment:  Used Ultrasonic and Hand Scaling, Flossed, Polished, Subgingival Irrigation - post tx - Chlorhexidine  Reviewed OHI  - Brush 2X/day and Floss 1X/day  Oral Hygiene:  Poor   Plaque: Moderate    Calculus: Moderate   Bleeding: Moderate to Heavy    Stain:  Light      Treatment Plan:  no changes to tx plan       Dr Kelle Aguillon:  Rubia Key gave anesthesia today                 Referral:  No referral given  Gave follow-up directions    NV1:  post SRP eval - 45 min  NV2:   3 month perio maintenance - 60 min  NV3:  Rest #8

## 2023-02-28 ENCOUNTER — OFFICE VISIT (OUTPATIENT)
Dept: ENDOCRINOLOGY | Facility: CLINIC | Age: 53
End: 2023-02-28

## 2023-02-28 VITALS
WEIGHT: 265 LBS | HEART RATE: 64 BPM | BODY MASS INDEX: 40.16 KG/M2 | SYSTOLIC BLOOD PRESSURE: 110 MMHG | TEMPERATURE: 98.6 F | DIASTOLIC BLOOD PRESSURE: 70 MMHG | HEIGHT: 68 IN

## 2023-02-28 DIAGNOSIS — I10 ESSENTIAL HYPERTENSION: ICD-10-CM

## 2023-02-28 DIAGNOSIS — Z79.4 TYPE 2 DIABETES MELLITUS WITH HYPERGLYCEMIA, WITH LONG-TERM CURRENT USE OF INSULIN (HCC): Primary | ICD-10-CM

## 2023-02-28 DIAGNOSIS — E11.65 TYPE 2 DIABETES MELLITUS WITH HYPERGLYCEMIA, WITH LONG-TERM CURRENT USE OF INSULIN (HCC): Primary | ICD-10-CM

## 2023-02-28 DIAGNOSIS — E78.2 MIXED HYPERLIPIDEMIA: ICD-10-CM

## 2023-02-28 NOTE — PROGRESS NOTES
Patient Progress Note      CC: DM      Referring Provider  No referring provider defined for this encounter  History of Present Illness:   Bhaskar Morris is a 47 y o  male with a history of type 2 diabetes with long term use of insulin  Diabetes course has been stable  Denies recent illness or hospitalizations  Denies recent severe hypoglycemic or severe hyperglycemic episodes  Denies any issues with his current regimen  Home glucose monitoring: are performed regularly, using Gosia    Did not bring  today   Home blood glucose readings: most readings are in the 200s mg/dl per patient   States his readings were better on Ozempic but he never had refills    Current regimen: Ozempic 0 5 mg weekly (he ran out for 2 months), Farxiga 10 mg daily, metformin 1000 mg twice a day (only taking it at night), glipizide 10 mg daily, Lantus 30 units daily  compliant most of the time, denies any side effects from medications  Injects in: abdomen  Rotates sites: Yes  Hypoglycemic episodes: No, never  H/o of hypoglycemia causing hospitalization or Intervention such as glucagon injection  or ambulance call :  No  Hypoglycemia symptoms: never less than 70 mg/dl  Treatment of hypoglycemia: juice    Medic alert tag: recommended: Yes    Diabetes education: Yes  Diet: 1 meal per day, 3-4 snacks per day  Timing of meals is predictable  Diabetic diet compliance:  noncompliant much of the time  Activity: Daily activity is predictable: Yes  No exercise  [facility] Ophthamology: August 2022  Podiatry: November 2022    Has hypertension: on ACE inhibitor/ARB, compliant most of the time  Has hyperlipidemia: on statin - tolerating well, no myalgias  compliant most of the time, denies any side effects from medications    Thyroid disorders: No  History of pancreatitis: No    Patient Active Problem List   Diagnosis   • CORTES (dyspnea on exertion)   • Essential hypertension   • Heart palpitations   • Moderate aortic stenosis   • Periodontitis • Asthma, mild persistent   • Depression   • Insomnia due to medical condition   • Back pain   • Midline low back pain without sciatica   • Other tear of medial meniscus, current injury, left knee, initial encounter   • Preventative health care   • Thoracic disc disease with myelopathy   • Diabetes ([facility] [address] )   • Mixed hyperlipidemia      Past Medical History:   Diagnosis Date   • Arthritis     back and leg pain   • Asthma    • Diabetes ([facility] [address] )    • [facility] ) 12/19/2022   • Ear problems       Past Surgical History:   Procedure Laterality Date   • BACK SURGERY     • KNEE SURGERY Left 01/23/2019    not replacement as per pt      Family History   Problem Relation Age of Onset   • Diabetes Father    • Diabetes Sister    • Diabetes Brother      Social History     Tobacco Use   • Smoking status: Every Day     Packs/day: 1 00     Years: 30 00     Pack years: 30 00     Types: Cigarettes   • Smokeless tobacco: Never   Substance Use Topics   • Alcohol use: Not Currently     No Known Allergies      Current Outpatient Medications:   •  albuterol (PROVENTIL HFA,VENTOLIN HFA) 90 mcg/act inhaler, Inhale 2 puffs every 6 (six) hours, Disp: , Rfl:   •  Alcohol Swabs (Pharmacist Choice Alcohol) PADS, 4 TIMES A DAY PT ON INSULINE BEGORE AFTER EACH BLOODSUGAR TESTING OR WITH INSULIN, Disp: , Rfl:   •  amLODIPine (NORVASC) 10 mg tablet, TAKE 1 TABLET(S) EVERY DAY BY ORAL ROUTE FOR 90 DAYS , Disp: , Rfl:   •  aspirin (ECOTRIN LOW STRENGTH) 81 mg EC tablet, TAKE 1 TABLET(S) EVERY DAY BY ORAL ROUTE FOR 90 DAYS , Disp: , Rfl:   •  atorvastatin (LIPITOR) 20 mg tablet, TAKE 1 TABLET(S) EVERY DAY BY ORAL ROUTE FOR 90 DAYS   CHOLESTEROL PILL, Disp: , Rfl:   •  B-D UF III MINI PEN NEEDLES 31G X 5 MM MISC, USE AS DIRECTED WITH INSULINE, Disp: , Rfl:   •  baclofen 10 mg tablet, Take 10 mg by mouth Daily for 6 days, Disp: , Rfl:   •  Blood Glucose Monitoring Suppl (Contour Next EZ) w/Device KIT, 4 TIMES A DAY, Disp: , Rfl:   •  buprenorphine-naloxone (Suboxone) 4-1 MG, PLACE 1 FILM(S) EVERY DAY BY SUBLINGUAL ROUTE FOR 30DAYS , Disp: , Rfl:   •  Continuous Blood Gluc  (FreeStyle Gosia 2 [place]) TILA, Use the reader to check blood glucose every 8 hr, Disp: 1 each, Rfl: 0  •  Continuous Blood Gluc Sensor (FreeStyle Gosia 2 Sensor) MISC, PLEASE USE SENSOR TO MEASURE BLOOD GLUCOSE, Disp: 1 each, Rfl: 0  •  Contour Next Test test strip, 4 TIMES A DAY PT ON INSULINE, Disp: , Rfl:   •  dapagliflozin (Farxiga) 10 MG tablet, Take 1 tablet (10 mg total) by mouth daily, Disp: 90 tablet, Rfl: 1  •  docusate sodium (COLACE) 100 mg capsule, as needed, Disp: , Rfl:   •  DULoxetine (CYMBALTA) 60 mg delayed release capsule, Take 60 mg by mouth daily , Disp: , Rfl:   •  fluticasone-salmeterol (Advair) 500-50 mcg/dose inhaler, INHALE 1 PUFF(S) TWICE A DAY BY INHALATION ROUTE, Disp: , Rfl:   •  gabapentin (NEURONTIN) 300 mg capsule, TAKE 1 CAPSULE 3 TIMES A DAY BY ORAL ROUTE FOR 90 DAYS , Disp: , Rfl:   •  glipiZIDE (GLUCOTROL) 10 mg tablet, TAKE 1 TABLET EVERY DAY BY ORAL ROUTE , Disp: , Rfl:   •  hydrochlorothiazide (HYDRODIURIL) 25 mg tablet, TAKE 1 TABLET(S) EVERY DAY BY ORAL ROUTE FOR 90 DAYS , Disp: , Rfl:   •  Lantus SoloStar 100 units/mL injection pen, 30 Units, Disp: , Rfl:   •  lisinopril-hydrochlorothiazide (PRINZIDE,ZESTORETIC) 20-25 MG per tablet, Take 1 tablet by mouth daily  , Disp: , Rfl:   •  metFORMIN (GLUCOPHAGE) 1000 MG tablet, Take 1 tablet (1,000 mg total) by mouth 2 (two) times a day with meals, Disp: 180 tablet, Rfl: 1  •  Pharmacist Choice Lancets MISC, USE AS DIRECTED   TEST EVERY DAY PER MD, Disp: , Rfl:   •  polyethylene glycol (GLYCOLAX) powder, as needed, Disp: , Rfl:   •  semaglutide, 0 25 or 0 5 mg/dose, (Ozempic, 0 25 or 0 5 MG/DOSE,) 2 mg/1 5 mL injection pen, Inject 0 5 mg weekly, Disp: 4 5 mL, Rfl: 1  •  SUBOXONE 12-3 MG FILM, TAKE 1 FILM SUBLINGUALY WITH 2MG STRIPS TOTAL 14MG DAILY, Disp: , Rfl: 0  •  SUBOXONE 2-0 5 MG, , Disp: , Rfl: 0  • traZODone (DESYREL) 100 mg tablet, daily at bedtime as needed, Disp: , Rfl:   Review of Systems   Constitutional: Positive for fatigue  Negative for activity change, appetite change and unexpected weight change  HENT: Negative for trouble swallowing  Eyes: Negative for visual disturbance  Respiratory: Negative for shortness of breath  Cardiovascular: Negative for chest pain and palpitations  Gastrointestinal: Negative for constipation and diarrhea  Endocrine: Negative for polydipsia and polyuria  Musculoskeletal: Negative  Skin: Negative for wound  Neurological: Negative for numbness  Psychiatric/Behavioral: Negative  Physical Exam:  Body mass index is 40 29 kg/m²  /70   Pulse 64   Temp 98 6 °F (37 °C) (Skin)   Ht 5' 8" (1 727 m)   Wt 120 kg (265 lb)   BMI 40 29 kg/m²    Wt Readings from Last 3 Encounters:   02/28/23 120 kg (265 lb)   12/21/22 118 kg (260 lb)   11/21/22 117 kg (259 lb)       Physical Exam  Vitals and nursing note reviewed  Constitutional:       Appearance: He is well-developed  HENT:      Head: Normocephalic  Eyes:      General: No scleral icterus  Pupils: Pupils are equal, round, and reactive to light  Neck:      Thyroid: No thyromegaly  Cardiovascular:      Rate and Rhythm: Normal rate and regular rhythm  Pulses:           Radial pulses are 2+ on the right side and 2+ on the left side  Heart sounds: Murmur heard  Pulmonary:      Effort: Pulmonary effort is normal  No respiratory distress  Breath sounds: Normal breath sounds  No wheezing  Musculoskeletal:      Cervical back: Neck supple  Skin:     General: Skin is warm and dry  Neurological:      Mental Status: He is alert  Patient's shoes and socks were not removed            Labs:   Lab Results   Component Value Date    HGBA1C 11 7 (H) 04/06/2022     Lab Results   Component Value Date    CALCIUM 9 0 04/06/2022    K 3 8 04/06/2022    CO2 28 04/06/2022     04/06/2022    BUN 17 04/06/2022    CREATININE 0 87 04/06/2022     No results found for: Beatrice Colbert  eGFR   Date Value Ref Range Status   04/06/2022 99 ml/min/1 73sq m Final     No components found for: [facility]  Lab Results   Component Value Date    HDL 32 (L) 04/06/2022    TRIG 74 04/06/2022     Lab Results   Component Value Date    ALT 36 04/06/2022    AST 15 04/06/2022    ALKPHOS 120 (H) 04/06/2022     Lab Results   Component Value Date    [id number] 1 760 04/06/2022           Plan:    Diagnoses and all orders for this visit:    Type 2 diabetes mellitus with hyperglycemia, with long-term current use of insulin ([facility])  HGA1C 11 7%  Due now  Treatment regimen: Restart Ozempic 0 5 mg weekly  Increase metformin to 1000 mg twice a day with meals  Continue other treatments  Stop by with Ryland Glynn  in 2 weeks for download  Make dietary changes  Discussed intensive insulin regimen does increase risk for hypoglycemia  Episodes of hypoglycemia can lead to permanent disability and death  Discussed risks/complications associated with uncontrolled diabetes  Advised to adhere to diabetic diet, and recommended staying active/exercising routinely as tolerated  Keep carbohydrates consistent to limit blood glucose fluctuations  Advised to call if blood sugars less than 70 mg/dl or over 300 mg/dl  Check blood glucose 3+ times a day  Discussed symptoms and treatment of hypoglycemia  Discussed use of CGM to collect additional blood glucose data to reveal trends and patterns that can be used to optimize treatment plan  Referred to diabetes/nutrition education  Recommended routine follow-up with podiatry and ophthalmology  Send log in 2 weeks  Ordered blood work to complete prior to next visit  -     Hemoglobin A1C; Future  -     Basic metabolic panel; Future  -     Microalbumin / creatinine urine ratio; Future  -     metFORMIN (GLUCOPHAGE) 1000 MG tablet;  Take 1 tablet (1,000 mg total) by mouth 2 (two) times a day with meals  -     semaglutide, 0 25 or 0 5 mg/dose, (Ozempic, 0 25 or 0 5 MG/DOSE,) 2 mg/1 5 mL injection pen; Inject 0 5 mg weekly    Essential hypertension  Blood pressure adequately controlled, continue current treatment  -     Basic metabolic panel; Future    Mixed hyperlipidemia  LDL previously 61  Continue statin therapy  Managed by PCP      Discussed with the patient diagnosis and treatment and all questions fully answered  He will call me if any problems arise  Counseled patient on diagnostic results, prognosis, risk and benefit of treatment options, instruction for management, importance of treatment compliance, risk factor reduction and impressions        Carrie Virgen PA-C

## 2023-02-28 NOTE — PATIENT INSTRUCTIONS
Hypoglycemia instructions   Carol Ansari  2/28/2023  [id number]    Low Blood Sugar    Steps to treat low blood sugar  1  Test blood sugar if you have symptoms of low blood sugar:   Low Blood Sugar Symptoms:  o Sweaty  o Dizzy  o Rapid heartbeat  o Shaky  o Bad mood  o Hungry      2  Treat blood sugar less than 70 with 15 grams of fast-acting carbohydrate:   Examples of 15 grams Fast-Acting Carbohydrate:  o 4 oz juice  o 4 oz regular soda  o 3-4 glucose tablets (chew)  o 3-4 hard candies (chew)          3  Wait 15 minutes and test your blood sugar again     4   If blood sugar is less than 100, repeat steps 2-3     5  When your blood sugar is 100 or more, eat a snack if it will be longer than one hour until your next meal  The snack should be 15 grams of carbohydrate and a protein:   Examples of snacks:  o ½ sandwich  o 6 crackers with cheese  o Piece of fruit with cheese or peanut butter  o 6 crackers with peanut butter

## 2023-03-01 ENCOUNTER — TELEPHONE (OUTPATIENT)
Dept: ENDOCRINOLOGY | Facility: CLINIC | Age: 53
End: 2023-03-01

## 2023-03-01 DIAGNOSIS — E11.65 TYPE 2 DIABETES MELLITUS WITH HYPERGLYCEMIA, WITH LONG-TERM CURRENT USE OF INSULIN (HCC): Primary | ICD-10-CM

## 2023-03-01 DIAGNOSIS — Z79.4 TYPE 2 DIABETES MELLITUS WITH HYPERGLYCEMIA, WITH LONG-TERM CURRENT USE OF INSULIN (HCC): Primary | ICD-10-CM

## 2023-03-07 NOTE — TELEPHONE ENCOUNTER
Trulicity is the preferred drug  Please d/c Ozempic and start Trulicity 0 37 mg weekly  After 4 weeks, he should call back to get a dose increase to 1 5 mg weekly if he tolerates the lower dose

## 2023-06-22 DIAGNOSIS — Z79.4 TYPE 2 DIABETES MELLITUS WITH HYPERGLYCEMIA, WITH LONG-TERM CURRENT USE OF INSULIN (HCC): ICD-10-CM

## 2023-06-22 DIAGNOSIS — E11.65 TYPE 2 DIABETES MELLITUS WITH HYPERGLYCEMIA, WITH LONG-TERM CURRENT USE OF INSULIN (HCC): ICD-10-CM

## 2023-08-25 DIAGNOSIS — E11.65 TYPE 2 DIABETES MELLITUS WITH HYPERGLYCEMIA, WITH LONG-TERM CURRENT USE OF INSULIN (HCC): ICD-10-CM

## 2023-08-25 DIAGNOSIS — Z79.4 TYPE 2 DIABETES MELLITUS WITH HYPERGLYCEMIA, WITH LONG-TERM CURRENT USE OF INSULIN (HCC): ICD-10-CM

## 2023-08-25 RX ORDER — DULAGLUTIDE 0.75 MG/.5ML
INJECTION, SOLUTION SUBCUTANEOUS
Qty: 6 ML | Refills: 0 | Status: SHIPPED | OUTPATIENT
Start: 2023-08-25

## 2023-09-08 ENCOUNTER — OFFICE VISIT (OUTPATIENT)
Dept: ENDOCRINOLOGY | Facility: CLINIC | Age: 53
End: 2023-09-08
Payer: MEDICARE

## 2023-09-08 VITALS
SYSTOLIC BLOOD PRESSURE: 144 MMHG | HEART RATE: 68 BPM | BODY MASS INDEX: 39.84 KG/M2 | OXYGEN SATURATION: 96 % | WEIGHT: 262 LBS | DIASTOLIC BLOOD PRESSURE: 86 MMHG

## 2023-09-08 DIAGNOSIS — E11.65 TYPE 2 DIABETES MELLITUS WITH HYPERGLYCEMIA, WITH LONG-TERM CURRENT USE OF INSULIN (HCC): Primary | ICD-10-CM

## 2023-09-08 DIAGNOSIS — I10 ESSENTIAL HYPERTENSION: ICD-10-CM

## 2023-09-08 DIAGNOSIS — E11.65 TYPE 2 DIABETES MELLITUS WITH HYPERGLYCEMIA, WITH LONG-TERM CURRENT USE OF INSULIN (HCC): ICD-10-CM

## 2023-09-08 DIAGNOSIS — Z79.4 TYPE 2 DIABETES MELLITUS WITH HYPERGLYCEMIA, WITH LONG-TERM CURRENT USE OF INSULIN (HCC): ICD-10-CM

## 2023-09-08 DIAGNOSIS — E78.2 MIXED HYPERLIPIDEMIA: ICD-10-CM

## 2023-09-08 DIAGNOSIS — Z79.4 TYPE 2 DIABETES MELLITUS WITH HYPERGLYCEMIA, WITH LONG-TERM CURRENT USE OF INSULIN (HCC): Primary | ICD-10-CM

## 2023-09-08 PROCEDURE — 99214 OFFICE O/P EST MOD 30 MIN: CPT | Performed by: INTERNAL MEDICINE

## 2023-09-08 RX ORDER — INSULIN DEGLUDEC INJECTION 100 U/ML
INJECTION, SOLUTION SUBCUTANEOUS
Qty: 15 ML | Refills: 0 | Status: SHIPPED | OUTPATIENT
Start: 2023-09-08

## 2023-09-08 RX ORDER — GLIPIZIDE 10 MG/1
TABLET ORAL
Qty: 60 TABLET | Refills: 5 | Status: SHIPPED | OUTPATIENT
Start: 2023-09-08

## 2023-09-08 RX ORDER — INSULIN GLARGINE 100 [IU]/ML
INJECTION, SOLUTION SUBCUTANEOUS
Qty: 15 ML | Refills: 5 | Status: SHIPPED | OUTPATIENT
Start: 2023-09-08 | End: 2023-09-08

## 2023-09-08 NOTE — PROGRESS NOTES
Yarelis Payan 46 y.o. male MRN: [id number]    Encounter: [id number]      Assessment/Plan     Assessment: This is a 46y.o.-year-old male with diabetes with hyperglycemia. Plan:    Diagnoses and all orders for this visit:    Type 2 diabetes mellitus with hyperglycemia, with long-term current use of insulin ([address])    Lab Results   Component Value Date    HGBA1C 11.7 (H) 04/06/2022   · As per patient A1c was 11.3% in doctor's office. Blood sugars are in 200 to 300 mg per DL range. Discussed the importance of checking blood sugar 2 times daily at least before breakfast and before dinner and sending log in 2 weeks for review. ·  Continue Lantus 32 units in the morning, discussed to increase glipizide to 10 mg twice a day before breakfast and before dinner. · We will discontinue Lily. · Start Jardiance 25 mg daily, kiko the side effects and benefits. Patient was also provided with coupons (10 dollars co-pay) discussed the importance of keeping himself well-hydrated  · Discussed hypoglycemia symptoms and treatment  · Also discussed the importance of keeping A1c to 7% goal to prevent complications of diabetes such as risk of heart attack, stroke, nephropathy, neuropathy and peripheral artery disease    -     Lantus SoloStar 100 units/mL SOPN; Inject 32 units in AM  -     Insulin Pen Needle 31G X 5 MM MISC; Use 4 times daily  -     glipiZIDE (GLUCOTROL) 10 mg tablet; Take one tablet twice a day  -     Empagliflozin (Jardiance) 25 MG TABS; Take 1 tablet (25 mg total) by mouth every morning  -     Basic metabolic panel; Future  -     Hemoglobin A1C; Future  -     Albumin / creatinine urine ratio;  Future    Essential hypertension  Blood pressure is slightly elevated, discussed the goal for blood pressure is less than 120/80 mmHg  Continue current management    Mixed hyperlipidemia  Lab Results   Component Value Date    LDLCALC 61 04/06/2022   Continue statins LDL is at goal, patient is currently taking Lipitor 20 mg daily  -     Lipid panel- Lab Collect; Future        CC: Diabetes    History of Present Illness     HPI:    Braden Shanks 79-year-old male with medical history of type 2 diabetes with long-term insulin use is here for follow-up. Diabetes course has been unstable. He denies any recent illness or hospitalizations. He denies severe hypoglycemic or hyperglycemic episodes. Complications of diabetes-none  He has changed his insurance and has very high co-pay , as well as deductible , does not want to take Trulicity , because of the cost .    current regimen includes  Farxiga 10 mg daily, and is not taking it metformin 1000 mg twice a day, which he admits missing many times   glipizide 10 mg daily, Lantus 30 units daily. He checks blood sugars once daily, blood sugars are usually in 200 to 300 mg per DL range. He had A1c checked by his primary care physician and it was 11.3%  He eats 2-3 meals, 1-2 snacks daily  He also has a history of hypertension and hyperlipidemia  No history of thyroid problems or pancreatitis    He has seen by ophthalmology recently, we do not have any records  Lab Results   Component Value Date    HGBA1C 11.7 (H) 04/06/2022     Lab Results   Component Value Date    CREATININE 0.87 04/06/2022            Review of Systems   Constitutional: Negative for activity change, diaphoresis, fatigue, fever and unexpected weight change. HENT: Negative. Eyes: Negative for visual disturbance. Respiratory: Negative for cough, chest tightness and shortness of breath. Cardiovascular: Negative for chest pain, palpitations and leg swelling. Gastrointestinal: Negative for abdominal pain, blood in stool, constipation, diarrhea, nausea and vomiting. Endocrine: Negative for cold intolerance, heat intolerance, polydipsia, polyphagia and polyuria. Genitourinary: Negative for dysuria, enuresis, frequency and urgency. Musculoskeletal: Negative for arthralgias and myalgias.    Skin: Negative for pallor, rash and wound. Allergic/Immunologic: Negative. Neurological: Negative for dizziness, tremors, weakness and numbness. Hematological: Negative. Psychiatric/Behavioral: Negative. Historical Information   Past Medical History:   Diagnosis Date   • Arthritis     back and leg pain   • Asthma    • Diabetes ([facility])    • Diabetes ([address] [facility]) 12/19/2022   • Ear problems      Past Surgical History:   Procedure Laterality Date   • BACK SURGERY     • KNEE SURGERY Left 01/23/2019    not replacement as per pt     Social History   Social History     Substance and Sexual Activity   Alcohol Use Not Currently     Social History     Substance and Sexual Activity   Drug Use No     Social History     Tobacco Use   Smoking Status Every Day   • Packs/day: 1.00   • Years: 30.00   • Total pack years: 30.00   • Types: Cigarettes   Smokeless Tobacco Never     Family History:   Family History   Problem Relation Age of Onset   • Diabetes Father    • Diabetes Sister    • Diabetes Brother        Meds/Allergies   Current Outpatient Medications   Medication Sig Dispense Refill   • albuterol (PROVENTIL HFA,VENTOLIN HFA) 90 mcg/act inhaler Inhale 2 puffs every 6 (six) hours     • Alcohol Swabs (Pharmacist Choice Alcohol) PADS 4 TIMES A DAY PT ON INSULINE [place] AFTER EACH BLOODSUGAR TESTING OR WITH INSULIN     • amLODIPine (NORVASC) 10 mg tablet TAKE 1 TABLET(S) EVERY DAY BY ORAL ROUTE FOR 90 DAYS. • aspirin (ECOTRIN LOW STRENGTH) 81 mg EC tablet TAKE 1 TABLET(S) EVERY DAY BY ORAL ROUTE FOR 90 DAYS. • atorvastatin (LIPITOR) 20 mg tablet TAKE 1 TABLET(S) EVERY DAY BY ORAL ROUTE FOR 90 DAYS.  CHOLESTEROL PILL     • B-D UF III MINI PEN NEEDLES 31G X 5 MM MISC USE AS DIRECTED WITH INSULINE     • baclofen 10 mg tablet Take 10 mg by mouth Daily for 6 days     • Blood Glucose Monitoring Suppl (Contour Next EZ) w/Device KIT 4 TIMES A DAY     • buprenorphine-naloxone (Suboxone) 4-1 MG PLACE 1 FILM(S) EVERY DAY BY SUBLINGUAL ROUTE FOR 30DAYS. • Continuous Blood Gluc  (FreeStyle [place] 2 [place]) TILA Use the reader to check blood glucose every 8 hr 1 each 0   • Continuous Blood Gluc Sensor (FreeStyle [place] 2 Sensor) MISC PLEASE USE SENSOR TO MEASURE BLOOD GLUCOSE 1 each 0   • Contour Next Test test strip 4 TIMES A DAY PT ON INSULINE     • docusate sodium (COLACE) 100 mg capsule as needed     • DULoxetine (CYMBALTA) 30 mg delayed release capsule TAKE 1 CAPSULE BY ORAL ROUTE FOR 90 DAYS WITH 60MG FOR TOTAL 90MG/D EVERY DAY     • DULoxetine (CYMBALTA) 60 mg delayed release capsule Take 60 mg by mouth daily      • Empagliflozin (Jardiance) 25 MG TABS Take 1 tablet (25 mg total) by mouth every morning 30 tablet 5   • fluticasone-salmeterol (Advair) 500-50 mcg/dose inhaler INHALE 1 PUFF(S) TWICE A DAY BY INHALATION ROUTE     • gabapentin (NEURONTIN) 300 mg capsule TAKE 1 CAPSULE 3 TIMES A DAY BY ORAL ROUTE FOR 90 DAYS. • glipiZIDE (GLUCOTROL) 10 mg tablet Take one tablet twice a day 60 tablet 5   • hydrochlorothiazide (HYDRODIURIL) 25 mg tablet TAKE 1 TABLET(S) EVERY DAY BY ORAL ROUTE FOR 90 DAYS. • Insulin Pen Needle 31G X 5 MM MISC Use 4 times daily 150 each 5   • Lantus SoloStar 100 units/mL SOPN Inject 32 units in AM 15 mL 5   • lisinopril (ZESTRIL) 40 mg tablet TAKE 1 TABLET (40 MG) BY ORAL ROUTE ONCE DAILY FOR 90 DAYS     • lisinopril-hydrochlorothiazide (PRINZIDE,ZESTORETIC) 20-25 MG per tablet Take 1 tablet by mouth daily       • metFORMIN (GLUCOPHAGE) 1000 MG tablet Take 1 tablet (1,000 mg total) by mouth 2 (two) times a day with meals 180 tablet 1   • Pharmacist Choice Lancets [facility] USE AS DIRECTED. TEST EVERY DAY PER MD     • polyethylene glycol (GLYCOLAX) powder as needed     • SUBOXONE 12-3 MG FILM TAKE 1 FILM SUBLINGUALY WITH 2MG STRIPS TOTAL 14MG DAILY  0   • SUBOXONE 2-0.5 MG   0   • traZODone (DESYREL) 100 mg tablet daily at bedtime as needed       No current facility-administered medications for this visit.      No Known Allergies    Objective   Vitals: Blood pressure 144/86, pulse 68, weight 119 kg (262 lb), SpO2 96 %. Physical Exam  Vitals reviewed. Constitutional:       General: He is not in acute distress. Appearance: Normal appearance. He is well-developed. He is obese. HENT:      Head: Normocephalic and atraumatic. Eyes:      Pupils: Pupils are equal, round, and reactive to light. Neck:      Thyroid: No thyromegaly. Cardiovascular:      Rate and Rhythm: Normal rate and regular rhythm. Heart sounds: Normal heart sounds. Pulmonary:      Effort: Pulmonary effort is normal. No respiratory distress. Breath sounds: Normal breath sounds. Musculoskeletal:         General: No deformity. Normal range of motion. Cervical back: Normal range of motion and neck supple. Skin:     General: Skin is warm and dry. Findings: No erythema. Neurological:      Mental Status: He is alert and oriented to person, place, and time. The history was obtained from the review of the chart, patient. Lab Results:        Lab Results   Component Value Date    HGBA1C 11.7 (H) 04/06/2022         Imaging Studies: I have personally reviewed pertinent reports. Portions of the record may have been created with voice recognition software. Occasional wrong word or "sound a like" substitutions may have occurred due to the inherent limitations of voice recognition software. Read the chart carefully and recognize, using context, where substitutions have occurred.

## 2023-09-08 NOTE — PATIENT INSTRUCTIONS
Take Glipizide 10 mg twice a day with breakfast and dinner   Take Jardiance 25 mg po daily with breakfast   Take metformin 1000 mg BID with meals   Take Lantus 32 units in the morning     Check Blood sugars before breakfast and before dinner , send the log in 2 weeks         Hypoglycemia instructions   Vaibhav Watts  9/8/2023  [id number]    Low Blood Sugar    Steps to treat low blood sugar. 1. Test blood sugar if you have symptoms of low blood sugar:   Low Blood Sugar Symptoms:  o Sweaty  o Dizzy  o Rapid heartbeat  o Shaky    o Bad mood  o Hungry      2. Treat blood sugar less than 70 with 15 grams of fast-acting carbohydrate:   Examples of 15 grams Fast-Acting Carbohydrate:  o 4 oz juice  o 4 oz regular soda  o 3-4 glucose tablets (chew)  o 3-4 hard candies (chew)              3.   Wait 15 minutes and test your blood sugar again           4.  If blood sugar is less than 100, repeat steps 2-3.      5. When your blood sugar is 100 or more, eat a snack if it will be longer than one hour until your next meal. The snack should be 15 grams of carbohydrate and a protein:   Examples of snacks:  o ½ sandwich  o 6 crackers with cheese  o Piece of fruit with cheese or peanut butter  o 6 crackers with peanut butter

## 2023-09-11 ENCOUNTER — TELEPHONE (OUTPATIENT)
Dept: ENDOCRINOLOGY | Facility: CLINIC | Age: 53
End: 2023-09-11

## 2023-10-10 ENCOUNTER — TELEPHONE (OUTPATIENT)
Dept: ENDOCRINOLOGY | Facility: CLINIC | Age: 53
End: 2023-10-10

## 2023-10-10 NOTE — TELEPHONE ENCOUNTER
Pt will come to the office to  the sample of Tresiba. He will go to [facility] to  Glucotrol.

## 2023-10-10 NOTE — TELEPHONE ENCOUNTER
Please inform patient that he should at least continue Glucotrol and [place], we can provide sample of Tresiba, until his next appointment.   Glucotrol is only $4 prescription at [facility], can print out the prescription for him so he can start taking [address]

## 2023-10-10 NOTE — TELEPHONE ENCOUNTER
Pt called he has states he hasn't started any of his medication. He said it is too much money. Glipizide, Jardiance and Tresiba.

## 2023-10-13 ENCOUNTER — TELEPHONE (OUTPATIENT)
Dept: ENDOCRINOLOGY | Facility: CLINIC | Age: 53
End: 2023-10-13

## 2024-01-06 DIAGNOSIS — Z79.4 TYPE 2 DIABETES MELLITUS WITH HYPERGLYCEMIA, WITH LONG-TERM CURRENT USE OF INSULIN (HCC): ICD-10-CM

## 2024-01-06 DIAGNOSIS — E11.65 TYPE 2 DIABETES MELLITUS WITH HYPERGLYCEMIA, WITH LONG-TERM CURRENT USE OF INSULIN (HCC): ICD-10-CM

## 2024-02-16 ENCOUNTER — HOSPITAL ENCOUNTER (OUTPATIENT)
Dept: RADIOLOGY | Facility: HOSPITAL | Age: 54
Discharge: HOME | End: 2024-02-16
Payer: MEDICARE

## 2024-02-16 ENCOUNTER — TELEPHONE (OUTPATIENT)
Age: 54
End: 2024-02-16

## 2024-02-16 DIAGNOSIS — M54.9 DORSALGIA: ICD-10-CM

## 2024-02-16 PROCEDURE — 72110 X-RAY EXAM L-2 SPINE 4/>VWS: CPT

## 2024-02-16 NOTE — TELEPHONE ENCOUNTER
Caller: Brett     Doctor: MELISSA    Reason for call: Pt has a physical referral but was unclear and unsure what his DX was and I advise pt that he contact his PCP to have them fax the referral over to  office to be sure we are able to treat his DX and get him scheduled with the Dr.     Please advise is fax is received.    Call back#: [phone]

## 2024-02-23 NOTE — PROGRESS NOTES
PT Evaluation     Today's date: 2024  Patient name: Brett Velasco  : 1970  MRN: [id number]  Referring provider: Lisa Frey CRNP  Dx:   Encounter Diagnosis     ICD-10-CM    1. Acute low back pain, unspecified back pain laterality, unspecified whether sciatica present  M54.50           Start Time: 1150  Stop Time: 1250  Total time in clinic (min): 60 minutes    Assessment  Assessment details: Brett Vleasco is a pleasant 53 y.o. male with a referred dx of dorsalgia from Lisa Frey CRNP.  Physical exam is most consistent with lower back strain s/p work-related injury. Upon further clinical testing, patient presents with the following deficits: pain with upper lumbar (L1-L2) segment PAVIMS, decrease active and passive lumbar mobility dysfunction, poor lumbopelvic-core motor control and weakness, decrease hip mobility bilaterally (L > R), and soft tissue hypertonicity of paraspinals. These deficits and impairments result in pain and difficulty with lighter ADLS, work-related task, decrease sleep quality, and increase discomfort with transitions during transfers (STS, supine to sit, etc). Pt was provided with a basic HEP focused on gentle lumbar active mobility which will be reviewed in the upcoming session. Pt able to demonstrate HEP with good technique and no pain. Educated pt to stop any exercises causing pain increase, pt verbalizes understanding. Pt was educated on anatomy and physiology of diagnosis and demonstrated verbal understanding. Due to patient already having improvement in his pain quality, I anticipate patient's symptoms continue to improve over the next few weeks with progressive movement/loading. Positive prognostic indicators include positive attitude toward recovery, good understanding of diagnosis and treatment plan options, acuity of symptoms, absence of peripheralization, and absence of observed red flags.  Negative prognostic indicators include hypertension, obesity, and diabetes  type 2. Pt would benefit from skilled OP physical therapy to address these, and the below impairments in order to optimize outcomes and promote return to functional baseline.        Problem List:  1) Lower Back Pain        Patient verbalized understanding of POC.    Please contact me if you have any questions or recommendations. Thank you for the referral and the opportunity to share in Brett's care.      Impairments: abnormal gait, abnormal muscle firing, abnormal muscle tone, abnormal or restricted ROM, abnormal movement, activity intolerance, impaired physical strength, lacks appropriate home exercise program, pain with function, weight-bearing intolerance, poor posture  and poor body mechanics    Symptom irritability: moderateUnderstanding of Dx/Px/POC: good   Prognosis: fair    Goals  Short Term Goals:  In 2-3 weeks, the patient will:  1. Pt will report at least a 25% reduction in subjective pain complaints/symptoms to better manage ADLs and household chores.   2. Pt will have 50% improvement in sleep quality and ability to lay in supine for prolonged positions  3. Pt independent with initial HEP, rationale, technique and frequency, for ROM and pain control.  4. Pt will be able to perform day to day with less assistance from family members  5. PT will be able to perform STS transfers without pain      Long Term Goals:  In 8-10 weeks, the patient will:  1. Pt will report no pain or stiffness with gross lumbar movement  2. FOTO to greater than predicted value.  3. Independent with comprehensive HEP upon discharge.  4. Pt will be able to perform ADLs, iADLS, and household duties with minimal restriction indicating return to PLOF.  5. Pt independent with rationale, technique and plan for performance of advanced HEP to ensure independent self-management of symptoms upon discharge.    Plan  Patient would benefit from: PT eval and skilled physical therapy  Referral necessary: No  Planned modality interventions:  "cryotherapy  Planned therapy interventions: activity modification, ADL retraining, joint mobilization, manual therapy, massage, balance, balance/weight bearing training, behavior modification, body mechanics training, muscle pump exercises, motor coordination training, nerve gliding, neuromuscular re-education, patient education, postural training, community reintegration, self care, sensory integrative techniques, strengthening, stretching, therapeutic activities, therapeutic exercise, therapeutic training, home exercise program, graded motor, graded exercise, graded activity, functional ROM exercises, gait training and work reintegration  Frequency: 1x week  Duration in weeks: 10  Plan of Care beginning date: 2/26/2024  Plan of Care expiration date: 4/22/2024  Treatment plan discussed with: patient      Subjective Evaluation    History of Present Illness  Date of onset: 2/14/2024  Mechanism of injury: Patient presents for initial PT evaluation regarding back pain sustained at work on 2/14/24. Patient states KAEL involved a near fall when trying to put salt out due to the snow. Patient braced himself onto a car to prevent falling on the ice and twisted his back. Immediately after, felt sharp \"nerve\" like pain in the right lower back. He states does history of back pain.     He is a full-time  at [employer] in [place].    Previously had \"bone removal\" thoracic spine surgery 2015.    Patient was given a back brace and has been using it but with no relief.     Pain Location: R Lower Back  Pain Intensity: Stiffness, Discomfort, Slight Twinges  KAEL: Twisting Injury   DOI: 2/14/24  Provoked by: STS transfers after prolonged sitting, lying supine, sleeping, walking  Eased Positions: None  Constitutional S/S: Diabetes  PLOF: patient was limited with heavier ADLs/tasks  Denies numbness and tingling                Not a recurrent problem   Patient Goals  Patient goals for therapy: decreased pain, " "increased strength, independence with ADLs/IADLs, return to sport/leisure activities, increased motion and return to work  Patient goal: to not be in pain and resume to work  Pain  Current pain ratin  At best pain ratin  At worst pain rating: 10  Location: Lower Back  Quality: sharp, radiating, discomfort, knife-like and dull ache  Relieving factors: change in position, relaxation and rest  Aggravating factors: standing, walking, running and lifting    Social Support  Steps to enter house: yes  3  Stairs in house: yes   6  Lives in: multiple-level home  Lives with: spouse and adult children    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 No acute abnormality.      Strength and ROM evaluated B from a regional biomechanical perspective and values relevant to this episode recorded in tables below.    ROM:   Joint / Motion  Right:  2024   Left :  2024     Lumbar Flexion  25% P!     Lumbar Extension  25%      Lumbar Sidebending  25% 25%   Lumbar Rotation 25% 25%   Hip ER  15 deg  10 deg   Hip IR  20 deg 10 deg     Repeated Movements:    Lying Baseline: 0/10      DURING MVMT.  RESPONSE AFTER  Lying Extension \"Stiffness\", denies pain Improves After       LE MMT Strength:  Test Action RIGHT  LEFT   Hip Flexion 4-/5 4-/5   Knee Extension 4/5 4/5   Knee Flexion 5/5 5/5   Ankle DF 5/5 5/5   Ankle PF 5/5 5/5      Additional Assessments:  Pain with palpation noted: Pain with PAVIM at L1-L2 level, denies pain with soft tissue palpation   Joint Mobility: WNL    NEURO Screen  Reflexes  Right Left   Patellar (L3-L4) 1+ 1+   Achilles (S1-S2) 1+ 1+   Almaraz's N N   Clonus N N      Myotome: Intact B/L  Dermatome: Intact " B/L    Special Tests:  Lumbar Specific and Neural Tension                                                                            Test / Measure  Right 2/26/2024 Left 2/26/2024   Straight Leg raise N N   90/90 Hamstring test Negative Poor   GRISEL N N   FADIR N N          Precautions: Type 2DM (long-term insulin), HTN, Asthma, Hx of Depression    POC expires Unit limit Auth Expiration date PT/OT + Visit Limit?   4/22/24 BOMN 12/31/24 BOMN         Visit/Unit Tracking  AUTH Status:  Date 2/26        pend Used 1         Remaining  pend           Pertinent Findings:      POC End Date: 4/22/24                                                                                          Test / Measure  2/26/2024     FOTO (Predicted )    Lumbar AROM 25% ROM   Hip MMT 4-/5           Access Code: [email or web address]  URL: [email or web address]  Date: 02/26/2024  Prepared by: Sona Lowe    Exercises  - Supine Lower Trunk Rotation with Swiss Ball  - 1 x daily - 7 x weekly - 3 sets - 20 reps  - Supine Hip and Knee Flexion AROM with Swiss Ball  - 1 x daily - 7 x weekly - 3 sets - 10 reps  - Supine Bridge on Swiss Ball  - 1 x daily - 7 x weekly - 3 sets - 6 reps    Manuals 2/26            L1-L2 Lumbar Central P-A Glides                                                    Neuro Re-Ed             LTR on Physioball             BKTC on Physioball             Supine Hip Flexion Marches             Bridges              Standing TrA Physioball Presses                                                                 Ther Ex             HEP/Patient Education 15'            NuStep             Seated Physioball Rollouts                                                                                           Ther Activity                                       Gait Training                                       Modalities              Self-Care/Moadlities Education   AR 10'

## 2024-02-26 ENCOUNTER — EVALUATION (OUTPATIENT)
Dept: PHYSICAL THERAPY | Facility: REHABILITATION | Age: 54
End: 2024-02-26
Payer: MEDICARE

## 2024-02-26 DIAGNOSIS — M54.50 ACUTE LOW BACK PAIN, UNSPECIFIED BACK PAIN LATERALITY, UNSPECIFIED WHETHER SCIATICA PRESENT: Primary | ICD-10-CM

## 2024-02-26 PROCEDURE — 97535 SELF CARE MNGMENT TRAINING: CPT

## 2024-02-26 PROCEDURE — 97161 PT EVAL LOW COMPLEX 20 MIN: CPT

## 2024-02-26 PROCEDURE — 97110 THERAPEUTIC EXERCISES: CPT

## 2024-03-04 NOTE — PROGRESS NOTES
Patient called 3/4/24 with positive response and improvement with his lower back pain with HEP given on evaluation. Due to patient expressing no concerns and improvement in symptoms, will discharge. If patient's symptoms worsen or return he may come back to PT. Pt verbally agreed.

## 2024-04-12 ENCOUNTER — OFFICE VISIT (OUTPATIENT)
Dept: ENDOCRINOLOGY | Facility: CLINIC | Age: 54
End: 2024-04-12
Payer: MEDICARE

## 2024-04-12 VITALS
HEART RATE: 87 BPM | DIASTOLIC BLOOD PRESSURE: 88 MMHG | BODY MASS INDEX: 38.62 KG/M2 | WEIGHT: 254 LBS | SYSTOLIC BLOOD PRESSURE: 130 MMHG | OXYGEN SATURATION: 95 %

## 2024-04-12 DIAGNOSIS — E78.2 MIXED HYPERLIPIDEMIA: ICD-10-CM

## 2024-04-12 DIAGNOSIS — Z79.4 TYPE 2 DIABETES MELLITUS WITH HYPERGLYCEMIA, WITH LONG-TERM CURRENT USE OF INSULIN (HCC): Primary | ICD-10-CM

## 2024-04-12 DIAGNOSIS — E11.65 TYPE 2 DIABETES MELLITUS WITH HYPERGLYCEMIA, WITH LONG-TERM CURRENT USE OF INSULIN (HCC): Primary | ICD-10-CM

## 2024-04-12 DIAGNOSIS — I10 ESSENTIAL HYPERTENSION: ICD-10-CM

## 2024-04-12 LAB — SL AMB POCT HEMOGLOBIN AIC: 11.7 (ref ?–6.5)

## 2024-04-12 PROCEDURE — 83036 HEMOGLOBIN GLYCOSYLATED A1C: CPT | Performed by: INTERNAL MEDICINE

## 2024-04-12 PROCEDURE — 99215 OFFICE O/P EST HI 40 MIN: CPT | Performed by: INTERNAL MEDICINE

## 2024-04-12 RX ORDER — INSULIN GLARGINE 100 [IU]/ML
INJECTION, SOLUTION SUBCUTANEOUS
Qty: 30 ML | Refills: 1 | Status: SHIPPED | OUTPATIENT
Start: 2024-04-12

## 2024-04-12 RX ORDER — INSULIN LISPRO 100 [IU]/ML
INJECTION, SOLUTION INTRAVENOUS; SUBCUTANEOUS
Qty: 30 ML | Refills: 1 | Status: SHIPPED | OUTPATIENT
Start: 2024-04-12

## 2024-04-12 NOTE — PROGRESS NOTES
Brett Velasco 53 y.o. male MRN: [id number]    Encounter: [id number]      Assessment/Plan     Assessment:  This is a 53 y.o.-year-old male with diabetes with hyperglycemia.    Plan:  Diagnoses and all orders for this visit:    Type 2 diabetes mellitus with hyperglycemia, with long-term current use of insulin ([facility])  Lab Results   Component Value Date    HGBA1C 11.7 (A) 04/12/2024   A1c is 11.7%, uncontrolled, suggestive of hyperglycemia  Increase Lantus to 42 units in the morning, start Humalog to 14 units before breakfast and 14 units before dinner.  Discontinue glipizide  Start Ozempic 0.25 mg once a week for 4 weeks and then increase the dose to 0.5 mg once a week  Continue Jardiance 25 mg daily  Discussed the side effects and benefits of the medication.  Discussed hypoglycemia symptoms and treatment  Discussed intensive insulin regimen does increase risk for hypoglycemia. Episodes of hypoglycemia can lead to permanent disability and death.  Discussed risks/complications associated with uncontrolled diabetes.    Advised to adhere to diabetic diet, and recommended staying active/exercising routinely.  Keep carbohydrates consistent to limit blood glucose fluctuations.  Advised to call if blood sugars less than 70 mg/dl or over 300 mg/dl.   Check blood glucose 4 times a day, with help of continuous glucose monitor sensor.  Patient  is using continuous glucose monitor sensor regularly and is being benefited from it as his treatment plan includes adjustment in insulin regimen based on blood sugars.  Discussed symptoms and treatment of hypoglycemia.   Recommended routine follow-up with podiatry and ophthalmology.   Ordered blood work to complete prior to next visit.      -     POCT hemoglobin A1c  -     Insulin Glargine Solostar 100 UNIT/ML SOPN; Inject 42 units in AM  -     insulin lispro (HumaLOG KwikPen) 100 units/mL injection pen; 14 units before breakfast and 14 units before dinner  -     semaglutide, 0.25 or 0.5  mg/dose, (Ozempic, 0.25 or 0.5 MG/DOSE,) 2 mg/3 mL injection pen; Inject 0.375 mL (0.25 mg total) under the skin every 7 days  -     Albumin / creatinine urine ratio; Future  -     Hemoglobin A1C; Future  -     Comprehensive metabolic panel; Future    Mixed hyperlipidemia  Lab Results   Component Value Date    LDLCALC 61 2022   LDL is 61, at goal  Continue statins, Lipitor 20 mg daily  Essential hypertension  Blood pressure is well-controlled, continue current management as per PCP    Patient Instructions     INSULIN DOSAGE INSTRUCTIONS    Name: Brett Velasco                        : 1970  MRN #: [id number]    Your Current Insulin  and dose is: Before Breakfast Before Lunch Before Evening Meal Bedtime     Humalog Insulin     14 units       14 units     Regular, Apidra, Humalog orNovolog Sliding Scale:   <80              151-200 + 2 + +2    201-250 + 4 + +4 +   251-300 + 6 + +6 +   301-350 + 8 + +8 +   >350 +10 + +10 +              Lantus Insulin   42 units         Additional Instructions:   Please test your blood sugar:  _4_ Times per day.  X_ Before Breakfast                _ Alternate Testing  X_ Before Lunch                _ 2 Hours After  Meal  X_ Before Evening Meal               _ 3 a.m.  x_ Before Bedtime Snack     Target Blood sugar range _70_to _140__.  Call if your Alfreda Anderson MD  blood sugar is less than _60_ or greater than _400__.    Today's Date: 2024       Hypoglycemia instructions   Brett Velasco  2024  [id number]    Low Blood Sugar    Steps to treat low blood sugar.    1. Test blood sugar if you have symptoms of low blood sugar:   Low Blood Sugar Symptoms:  o Sweaty  o Dizzy  o Rapid heartbeat  o Shaky    o Bad mood  o Hungry      2. Treat blood sugar less than 70 with 15 grams of fast-acting carbohydrate:   Examples of 15 grams Fast-Acting Carbohydrate:  o 4 oz juice  o 4 oz regular soda  o 3-4 glucose tablets (chew)  o 3-4 hard candies (chew)              3.   Wait 15  minutes and test your blood sugar again           4. If blood sugar is less than 100, repeat steps 2-3.      5. When your blood sugar is 100 or more, eat a snack if it will be longer than one hour until your next meal. The snack should be 15 grams of carbohydrate and a protein:   Examples of snacks:  o ½ sandwich  o 6 crackers with cheese  o Piece of fruit with cheese or peanut butter  o 6 crackers with peanut butter      I have spent a total time of 40  minutes on 04/15/24 in caring for this patient including Diagnostic results, Prognosis, Risks and benefits of tx options, Instructions for management, Patient and family education, Importance of tx compliance, Risk factor reductions, Impressions, Counseling / Coordination of care, Documenting in the medical record, Reviewing / ordering tests, medicine, procedures  , and Obtaining or reviewing history  .        CC: Diabetes    History of Present Illness     HPI:    Brett Velasco is a 53-year-old male with medical history of uncontrolled type 2 diabetes with long-term insulin use is here for follow-up.  Diabetes course has been unstable.  He denies any recent illnesses or hospitalizations.  He denies severe hypoglycemic or hyperglycemic episodes requiring hospitalization.  Complications of diabetes-none  Current insulin regimen includes Lantus 30 units daily, glipizide 10 mg daily, metformin 1000 mg twice a day.  He checks blood sugars periodically and they are usually in 300 to 400 mg per DL range  2-3 meals, 1-2 snacks daily.    He also has history of hypertension and hyperlipidemia, currently managed on Lipitor 20 mg daily.  He admits compliance, denies side effects.  Last LDL is at goal.  For hypertension he takes Norvasc 10 mg daily, blood pressure well-controlled        Lab Results   Component Value Date    HGBA1C 11.7 (A) 04/12/2024       Lab Results   Component Value Date    CREATININE 0.87 04/06/2022     BP Readings from Last 3 Encounters:   04/12/24 130/88    09/08/23 144/86   02/28/23 110/70      Lab Results   Component Value Date    LDLCALC 61 04/06/2022     Lab Results   Component Value Date    CREATININE 0.87 04/06/2022          Review of Systems    Historical Information   Past Medical History:   Diagnosis Date    Arthritis     back and leg pain    Asthma     Diabetes (HCC)     Diabetes (HCC) 12/19/2022    Ear problems      Past Surgical History:   Procedure Laterality Date    BACK SURGERY      KNEE SURGERY Left 01/23/2019    not replacement as per pt     Social History   Social History     Substance and Sexual Activity   Alcohol Use Not Currently     Social History     Substance and Sexual Activity   Drug Use No     Social History     Tobacco Use   Smoking Status Every Day    Current packs/day: 1.00    Average packs/day: 1 pack/day for 30.0 years (30.0 ttl pk-yrs)    Types: Cigarettes   Smokeless Tobacco Never     Family History:   Family History   Problem Relation Age of Onset    Diabetes Father     Diabetes Sister     Diabetes Brother        Meds/Allergies   Current Outpatient Medications   Medication Sig Dispense Refill    albuterol (PROVENTIL HFA,VENTOLIN HFA) 90 mcg/act inhaler Inhale 2 puffs every 6 (six) hours      Alcohol Swabs (Pharmacist Choice Alcohol) PADS 4 TIMES A DAY PT ON INSULINE BEGORE AFTER EACH BLOODSUGAR TESTING OR WITH INSULIN      amLODIPine (NORVASC) 10 mg tablet TAKE 1 TABLET(S) EVERY DAY BY ORAL ROUTE FOR 90 DAYS.      aspirin (ECOTRIN LOW STRENGTH) 81 mg EC tablet TAKE 1 TABLET(S) EVERY DAY BY ORAL ROUTE FOR 90 DAYS.      atorvastatin (LIPITOR) 20 mg tablet TAKE 1 TABLET(S) EVERY DAY BY ORAL ROUTE FOR 90 DAYS. CHOLESTEROL PILL      B-D UF III MINI PEN NEEDLES 31G X 5 MM MISC USE AS DIRECTED WITH INSULINE      baclofen 10 mg tablet Take 10 mg by mouth Daily for 6 days      Blood Glucose Monitoring Suppl (Contour Next EZ) w/Device KIT 4 TIMES A DAY      buprenorphine-naloxone (Suboxone) 4-1 MG PLACE 1 FILM(S) EVERY DAY BY SUBLINGUAL ROUTE  FOR 30DAYS.      Continuous Blood Gluc  (FreeStyle Gosia 2 [place]) TILA Use the reader to check blood glucose every 8 hr 1 each 0    Continuous Blood Gluc Sensor (FreeStyle Gosia 2 Sensor) MISC PLEASE USE SENSOR TO MEASURE BLOOD GLUCOSE 1 each 0    Contour Next Test test strip 4 TIMES A DAY PT ON INSULINE      docusate sodium (COLACE) 100 mg capsule as needed      DULoxetine (CYMBALTA) 30 mg delayed release capsule TAKE 1 CAPSULE BY ORAL ROUTE FOR 90 DAYS WITH 60MG FOR TOTAL 90MG/D EVERY DAY      DULoxetine (CYMBALTA) 60 mg delayed release capsule Take 60 mg by mouth daily       Empagliflozin (Jardiance) 25 MG TABS Take 1 tablet (25 mg total) by mouth every morning 30 tablet 5    fluticasone-salmeterol (Advair) 500-50 mcg/dose inhaler INHALE 1 PUFF(S) TWICE A DAY BY INHALATION ROUTE      gabapentin (NEURONTIN) 300 mg capsule TAKE 1 CAPSULE 3 TIMES A DAY BY ORAL ROUTE FOR 90 DAYS.      hydrochlorothiazide (HYDRODIURIL) 25 mg tablet TAKE 1 TABLET(S) EVERY DAY BY ORAL ROUTE FOR 90 DAYS.      Insulin Glargine Solostar 100 UNIT/ML SOPN Inject 42 units in AM 30 mL 1    insulin lispro (HumaLOG KwikPen) 100 units/mL injection pen 14 units before breakfast and 14 units before dinner 30 mL 1    Insulin Pen Needle 31G X 5 MM MISC Use 4 times daily 150 each 5    lisinopril (ZESTRIL) 40 mg tablet TAKE 1 TABLET (40 MG) BY ORAL ROUTE ONCE DAILY FOR 90 DAYS      lisinopril-hydrochlorothiazide (PRINZIDE,ZESTORETIC) 20-25 MG per tablet Take 1 tablet by mouth daily        metFORMIN (GLUCOPHAGE) 1000 MG tablet TAKE 1 TABLET (1,000 MG TOTAL) BY MOUTH 2 (TWO) TIMES A DAY WITH MEALS 180 tablet 1    Pharmacist Choice Lancets [facility] USE AS DIRECTED. TEST EVERY DAY PER MD      polyethylene glycol (GLYCOLAX) powder as needed      semaglutide, 0.25 or 0.5 mg/dose, (Ozempic, 0.25 or 0.5 MG/DOSE,) 2 mg/3 mL injection pen Inject 0.375 mL (0.25 mg total) under the skin every 7 days 3 mL 5    SUBOXONE 12-3 MG FILM TAKE 1 FILM SUBLINGUALY WITH  "2MG STRIPS TOTAL 14MG DAILY  0    SUBOXONE 2-0.5 MG   0    traZODone (DESYREL) 100 mg tablet daily at bedtime as needed       No current facility-administered medications for this visit.     No Known Allergies    Objective   Vitals: Blood pressure 130/88, pulse 87, weight 115 kg (254 lb), SpO2 95%.    Physical Exam  Vitals reviewed.   Constitutional:       General: He is not in acute distress.     Appearance: He is well-developed.   HENT:      Head: Normocephalic and atraumatic.   Eyes:      Pupils: Pupils are equal, round, and reactive to light.   Neck:      Thyroid: No thyromegaly.   Cardiovascular:      Rate and Rhythm: Normal rate and regular rhythm.      Heart sounds: Normal heart sounds.   Pulmonary:      Effort: Pulmonary effort is normal. No respiratory distress.      Breath sounds: Normal breath sounds.   Musculoskeletal:         General: No deformity. Normal range of motion.      Cervical back: Normal range of motion and neck supple.   Skin:     General: Skin is warm and dry.      Findings: No erythema.   Neurological:      Mental Status: He is alert and oriented to person, place, and time.         The history was obtained from the review of the chart, patient.    Lab Results:   Lab Results   Component Value Date/Time    Hemoglobin A1C 11.7 (A) 04/12/2024 12:49 PM           Imaging Studies: I have personally reviewed pertinent reports.      Portions of the record may have been created with voice recognition software. Occasional wrong word or \"sound a like\" substitutions may have occurred due to the inherent limitations of voice recognition software. Read the chart carefully and recognize, using context, where substitutions have occurred.    "

## 2024-04-12 NOTE — PATIENT INSTRUCTIONS
INSULIN DOSAGE INSTRUCTIONS    Name: Brett Velasco                        : 1970  MRN #: [id number]    Your Current Insulin  and dose is: Before Breakfast Before Lunch Before Evening Meal Bedtime     Humalog Insulin     14 units       14 units     Regular, Apidra, Humalog orNovolog Sliding Scale:   <80              151-200 + 2 + +2    201-250 + 4 + +4 +   251-300 + 6 + +6 +   301-350 + 8 + +8 +   >350 +10 + +10 +              Lantus Insulin   42 units         Additional Instructions:   Please test your blood sugar:  _4_ Times per day.  X_ Before Breakfast                _ Alternate Testing  X_ Before Lunch                _ 2 Hours After  Meal  X_ Before Evening Meal               _ 3 a.m.  x_ Before Bedtime Snack     Target Blood sugar range _70_to _140__.  Call if your Alfreda Anderson MD  blood sugar is less than _60_ or greater than _400__.    Today's Date: 2024       Hypoglycemia instructions   Brett Velasco  2024  [id number]    Low Blood Sugar    Steps to treat low blood sugar.    1. Test blood sugar if you have symptoms of low blood sugar:   Low Blood Sugar Symptoms:  o Sweaty  o Dizzy  o Rapid heartbeat  o Shaky    o Bad mood  o Hungry      2. Treat blood sugar less than 70 with 15 grams of fast-acting carbohydrate:   Examples of 15 grams Fast-Acting Carbohydrate:  o 4 oz juice  o 4 oz regular soda  o 3-4 glucose tablets (chew)  o 3-4 hard candies (chew)              3.   Wait 15 minutes and test your blood sugar again           4. If blood sugar is less than 100, repeat steps 2-3.      5. When your blood sugar is 100 or more, eat a snack if it will be longer than one hour until your next meal. The snack should be 15 grams of carbohydrate and a protein:   Examples of snacks:  o ½ sandwich  o 6 crackers with cheese  o Piece of fruit with cheese or peanut butter  o 6 crackers with peanut butter

## 2024-04-14 ENCOUNTER — TELEPHONE (OUTPATIENT)
Age: 54
End: 2024-04-14

## 2024-04-14 NOTE — TELEPHONE ENCOUNTER
PA for Insulin Glargine Solostar     Submitted via    [x]CM[facility]-KEY [id number]  []Surescripts-Case ID #    []Faxed to plan   []Other website    []Phone call Case ID #      Office notes sent, clinical questions answered. Awaiting determination    Turnaround time for your insurance to make a decision on your Prior Authorization can take 7-21 business days.

## 2024-04-17 ENCOUNTER — TELEPHONE (OUTPATIENT)
Dept: ENDOCRINOLOGY | Facility: CLINIC | Age: 54
End: 2024-04-17

## 2024-06-24 DIAGNOSIS — E11.65 TYPE 2 DIABETES MELLITUS WITH HYPERGLYCEMIA, WITH LONG-TERM CURRENT USE OF INSULIN (HCC): ICD-10-CM

## 2024-06-24 DIAGNOSIS — Z79.4 TYPE 2 DIABETES MELLITUS WITH HYPERGLYCEMIA, WITH LONG-TERM CURRENT USE OF INSULIN (HCC): ICD-10-CM

## 2024-07-22 DIAGNOSIS — Z79.4 TYPE 2 DIABETES MELLITUS WITH HYPERGLYCEMIA, WITH LONG-TERM CURRENT USE OF INSULIN (HCC): ICD-10-CM

## 2024-07-22 DIAGNOSIS — E11.65 TYPE 2 DIABETES MELLITUS WITH HYPERGLYCEMIA, WITH LONG-TERM CURRENT USE OF INSULIN (HCC): ICD-10-CM

## 2024-07-22 RX ORDER — INSULIN GLARGINE 100 [IU]/ML
INJECTION, SOLUTION SUBCUTANEOUS
Qty: 30 ML | Refills: 1 | Status: SHIPPED | OUTPATIENT
Start: 2024-07-22

## 2024-08-14 ENCOUNTER — TRANSCRIBE ORDERS (OUTPATIENT)
Dept: SLEEP CENTER | Facility: CLINIC | Age: 54
End: 2024-08-14

## 2024-08-14 DIAGNOSIS — E66.9 OBESITY, UNSPECIFIED CLASSIFICATION, UNSPECIFIED OBESITY TYPE, UNSPECIFIED WHETHER SERIOUS COMORBIDITY PRESENT: Primary | ICD-10-CM

## 2024-08-26 ENCOUNTER — HOSPITAL ENCOUNTER (OUTPATIENT)
Dept: RADIOLOGY | Facility: HOSPITAL | Age: 54
Discharge: HOME | End: 2024-08-26
Attending: STUDENT IN AN ORGANIZED HEALTH CARE EDUCATION/TRAINING PROGRAM
Payer: MEDICARE

## 2024-08-26 DIAGNOSIS — Z12.2 ENCOUNTER FOR SCREENING FOR MALIGNANT NEOPLASM OF RESPIRATORY ORGANS: ICD-10-CM

## 2024-08-26 PROCEDURE — 71271 CT THORAX LUNG CANCER SCR C-: CPT

## 2024-08-27 ENCOUNTER — TELEPHONE (OUTPATIENT)
Age: 54
End: 2024-08-27

## 2024-08-27 ENCOUNTER — OFFICE VISIT (OUTPATIENT)
Dept: ENDOCRINOLOGY | Facility: CLINIC | Age: 54
End: 2024-08-27
Payer: MEDICARE

## 2024-08-27 ENCOUNTER — LAB (OUTPATIENT)
Dept: LAB | Facility: CLINIC | Age: 54
End: 2024-08-27
Payer: MEDICARE

## 2024-08-27 VITALS
DIASTOLIC BLOOD PRESSURE: 80 MMHG | HEIGHT: 68 IN | SYSTOLIC BLOOD PRESSURE: 136 MMHG | OXYGEN SATURATION: 96 % | WEIGHT: 253 LBS | HEART RATE: 69 BPM | BODY MASS INDEX: 38.34 KG/M2

## 2024-08-27 DIAGNOSIS — E11.65 TYPE 2 DIABETES MELLITUS WITH HYPERGLYCEMIA, WITH LONG-TERM CURRENT USE OF INSULIN (HCC): ICD-10-CM

## 2024-08-27 DIAGNOSIS — Z79.4 TYPE 2 DIABETES MELLITUS WITH HYPERGLYCEMIA, WITH LONG-TERM CURRENT USE OF INSULIN (HCC): ICD-10-CM

## 2024-08-27 DIAGNOSIS — E11.65 TYPE 2 DIABETES MELLITUS WITH HYPERGLYCEMIA, WITH LONG-TERM CURRENT USE OF INSULIN (HCC): Primary | ICD-10-CM

## 2024-08-27 DIAGNOSIS — E78.2 MIXED HYPERLIPIDEMIA: ICD-10-CM

## 2024-08-27 DIAGNOSIS — Z79.4 TYPE 2 DIABETES MELLITUS WITH HYPERGLYCEMIA, WITH LONG-TERM CURRENT USE OF INSULIN (HCC): Primary | ICD-10-CM

## 2024-08-27 DIAGNOSIS — I10 ESSENTIAL HYPERTENSION: ICD-10-CM

## 2024-08-27 LAB
ALBUMIN SERPL BCG-MCNC: 3.8 G/DL (ref 3.5–5)
ALP SERPL-CCNC: 84 U/L (ref 34–104)
ALT SERPL W P-5'-P-CCNC: 14 U/L (ref 7–52)
ANION GAP SERPL CALCULATED.3IONS-SCNC: 5 MMOL/L (ref 4–13)
AST SERPL W P-5'-P-CCNC: 13 U/L (ref 13–39)
BILIRUB SERPL-MCNC: 0.52 MG/DL (ref 0.2–1)
BUN SERPL-MCNC: 14 MG/DL (ref 5–25)
CALCIUM SERPL-MCNC: 8.7 MG/DL (ref 8.4–10.2)
CHLORIDE SERPL-SCNC: 101 MMOL/L (ref 96–108)
CO2 SERPL-SCNC: 28 MMOL/L (ref 21–32)
CREAT SERPL-MCNC: 0.8 MG/DL (ref 0.6–1.3)
EST. AVERAGE GLUCOSE BLD GHB EST-MCNC: 283 MG/DL
GFR SERPL CREATININE-BSD FRML MDRD: 101 ML/MIN/1.73SQ M
GLUCOSE SERPL-MCNC: 243 MG/DL (ref 65–140)
HBA1C MFR BLD: 11.5 %
POTASSIUM SERPL-SCNC: 3.9 MMOL/L (ref 3.5–5.3)
PROT SERPL-MCNC: 6.9 G/DL (ref 6.4–8.4)
SL AMB POCT HEMOGLOBIN AIC: 11.6 (ref ?–6.5)
SODIUM SERPL-SCNC: 134 MMOL/L (ref 135–147)

## 2024-08-27 PROCEDURE — 99214 OFFICE O/P EST MOD 30 MIN: CPT | Performed by: PHYSICIAN ASSISTANT

## 2024-08-27 PROCEDURE — 83036 HEMOGLOBIN GLYCOSYLATED A1C: CPT | Performed by: PHYSICIAN ASSISTANT

## 2024-08-27 PROCEDURE — 80053 COMPREHEN METABOLIC PANEL: CPT

## 2024-08-27 PROCEDURE — 83036 HEMOGLOBIN GLYCOSYLATED A1C: CPT

## 2024-08-27 PROCEDURE — 36415 COLL VENOUS BLD VENIPUNCTURE: CPT

## 2024-08-27 RX ORDER — METFORMIN HYDROCHLORIDE 750 MG/1
750 TABLET, EXTENDED RELEASE ORAL
Qty: 60 TABLET | Refills: 0 | Status: SHIPPED | OUTPATIENT
Start: 2024-08-27

## 2024-08-27 RX ORDER — INSULIN ASPART 100 [IU]/ML
INJECTION, SOLUTION INTRAVENOUS; SUBCUTANEOUS
Qty: 15 ML | Refills: 1 | Status: SHIPPED | OUTPATIENT
Start: 2024-08-27

## 2024-08-27 RX ORDER — BLOOD-GLUCOSE SENSOR
EACH MISCELLANEOUS
Qty: 2 EACH | Refills: 5 | Status: SHIPPED | OUTPATIENT
Start: 2024-08-27

## 2024-08-27 NOTE — PATIENT INSTRUCTIONS
Hypoglycemia instructions   Brett Velasco  8/27/2024  [id number]    Low Blood Sugar    Steps to treat low blood sugar.    1. Test blood sugar if you have symptoms of low blood sugar:   Low Blood Sugar Symptoms:  o Sweaty  o Dizzy  o Rapid heartbeat  o Shaky  o Bad mood  o Hungry      2. Treat blood sugar less than 70 with 15 grams of fast-acting carbohydrate:   Examples of 15 grams Fast-Acting Carbohydrate:  o 4 oz juice  o 4 oz regular soda  o 3-4 glucose tablets (chew)  o 3-4 hard candies (chew)          3.  Wait 15 minutes and test your blood sugar again     4. If blood sugar is less than 100, repeat steps 2-3.    5. When your blood sugar is 100 or more, eat a snack if it will be longer than one hour until your next meal. The snack should be 15 grams of carbohydrate and a protein:   Examples of snacks:  o ½ sandwich  o 6 crackers with cheese  o Piece of fruit with cheese or peanut butter  o 6 crackers with peanut butter

## 2024-08-27 NOTE — PROGRESS NOTES
Patient Progress Note      CC: DM    Referring Provider  No referring provider defined for this encounter.     History of Present Illness:   Brett Velasco is a 53 y.o. male with a history of type 2 diabetes with long term use of insulin. Diabetes course has been stable. Denies recent illness or hospitalizations. Denies recent severe hypoglycemic or severe hyperglycemic episodes. Denies any issues with his current regimen. Home glucose monitoring: are performed sporadically     No log or meter today  Home blood glucose readings: reports when he occasionally checks his readings are in the 200s mg/dl      Current regimen: Lantus 42 units daily in a.m., metformin 1000 mg twice daily (not taking due to GI intolerance), Humalog 14 units twice daily with meals (not taking, he he never received), Ozempic 0.5 mg weekly, Jardiance 25 mg daily  compliant some of the time, denies any side effects from medications.  Injects in: abdomen. Rotates sites: Yes  Hypoglycemic episodes: No, never  H/o of hypoglycemia causing hospitalization or Intervention such as glucagon injection or ambulance call : No  Hypoglycemia symptoms: never less than 70 mg/dl  Treatment of hypoglycemia: juice     Medic alert tag: recommended: Yes     Diabetes education: Yes  Diet: 1-2 meals per day, 3-4 snacks per day. Timing of meals is predictable.   Diabetic diet compliance: noncompliant much of the time  Activity: Daily activity is predictable: Yes. No exercise but he tries to stay active.     Ophthamology: August 2022. He has one scheduled for Oct.  Podiatry: November 2022     Has hypertension: on ACE inhibitor/ARB, compliant most of the time  Has hyperlipidemia: on statin - tolerating well, no myalgias. compliant most of the time, denies any side effects from medications.  Thyroid disorders: No  History of pancreatitis: No      Patient Active Problem List   Diagnosis    CORTES (dyspnea on exertion)    Essential hypertension    Heart palpitations     Moderate aortic stenosis    Periodontitis    Asthma, mild persistent    Depression    Insomnia due to medical condition    Back pain    Midline low back pain without sciatica    Other tear of medial meniscus, current injury, left knee, initial encounter    Preventative health care    Thoracic disc disease with myelopathy    Diabetes (HCC)    Mixed hyperlipidemia      Past Medical History:   Diagnosis Date    Arthritis     back and leg pain    Asthma     Diabetes (HCC)     Diabetes (HCC) 12/19/2022    Ear problems       Past Surgical History:   Procedure Laterality Date    BACK SURGERY      KNEE SURGERY Left 01/23/2019    not replacement as per pt      Family History   Problem Relation Age of Onset    Diabetes Father     Diabetes Sister     Diabetes Brother      Social History     Tobacco Use    Smoking status: Every Day     Current packs/day: 1.00     Average packs/day: 1 pack/day for 30.0 years (30.0 ttl pk-yrs)     Types: Cigarettes    Smokeless tobacco: Never   Substance Use Topics    Alcohol use: Not Currently     No Known Allergies      Current Outpatient Medications:     albuterol (PROVENTIL HFA,VENTOLIN HFA) 90 mcg/act inhaler, Inhale 2 puffs every 6 (six) hours, Disp: , Rfl:     Alcohol Swabs (Pharmacist Choice Alcohol) PADS, 4 TIMES A DAY PT ON INSULINE BEGORE AFTER EACH BLOODSUGAR TESTING OR WITH INSULIN, Disp: , Rfl:     amLODIPine (NORVASC) 10 mg tablet, TAKE 1 TABLET(S) EVERY DAY BY ORAL ROUTE FOR 90 DAYS., Disp: , Rfl:     aspirin (ECOTRIN LOW STRENGTH) 81 mg EC tablet, TAKE 1 TABLET(S) EVERY DAY BY ORAL ROUTE FOR 90 DAYS., Disp: , Rfl:     atorvastatin (LIPITOR) 20 mg tablet, TAKE 1 TABLET(S) EVERY DAY BY ORAL ROUTE FOR 90 DAYS. CHOLESTEROL PILL, Disp: , Rfl:     B-D UF III MINI PEN NEEDLES 31G X 5 MM MISC, USE AS DIRECTED WITH INSULINE, Disp: , Rfl:     baclofen 10 mg tablet, Take 10 mg by mouth Daily for 6 days, Disp: , Rfl:     Blood Glucose Monitoring Suppl (Contour Next EZ) w/Device KIT, 4 TIMES  A DAY, Disp: , Rfl:     buprenorphine-naloxone (Suboxone) 4-1 MG, PLACE 1 FILM(S) EVERY DAY BY SUBLINGUAL ROUTE FOR 30DAYS., Disp: , Rfl:     Continuous Glucose Sensor (FreeStyle Gosia 3 Sensor) MISC, Apply every 2 weeks., Disp: 2 each, Rfl: 5    Contour Next Test test strip, 4 TIMES A DAY PT ON INSULINE, Disp: , Rfl:     docusate sodium (COLACE) 100 mg capsule, as needed, Disp: , Rfl:     DULoxetine (CYMBALTA) 30 mg delayed release capsule, TAKE 1 CAPSULE BY ORAL ROUTE FOR 90 DAYS WITH 60MG FOR TOTAL 90MG/D EVERY DAY, Disp: , Rfl:     DULoxetine (CYMBALTA) 60 mg delayed release capsule, Take 60 mg by mouth daily , Disp: , Rfl:     Empagliflozin (Jardiance) 25 MG TABS, Take 1 tablet (25 mg total) by mouth every morning, Disp: 30 tablet, Rfl: 5    fluticasone-salmeterol (Advair) 500-50 mcg/dose inhaler, INHALE 1 PUFF(S) TWICE A DAY BY INHALATION ROUTE, Disp: , Rfl:     gabapentin (NEURONTIN) 300 mg capsule, TAKE 1 CAPSULE 3 TIMES A DAY BY ORAL ROUTE FOR 90 DAYS., Disp: , Rfl:     hydrochlorothiazide (HYDRODIURIL) 25 mg tablet, TAKE 1 TABLET(S) EVERY DAY BY ORAL ROUTE FOR 90 DAYS., Disp: , Rfl:     insulin aspart (NovoLOG FlexPen) 100 UNIT/ML injection pen, Take 14 units with breakfast and 14 units with dinner, Disp: 15 mL, Rfl: 1    Insulin Glargine Solostar (Lantus SoloStar) 100 UNIT/ML [facility], INJECT 42 UNITS IN IN THE MORNING, Disp: 30 mL, Rfl: 1    Insulin Pen Needle 31G X 5 MM MISC, Use 4 times daily, Disp: 150 each, Rfl: 5    lisinopril (ZESTRIL) 40 mg tablet, TAKE 1 TABLET (40 MG) BY ORAL ROUTE ONCE DAILY FOR 90 DAYS, Disp: , Rfl:     lisinopril-hydrochlorothiazide (PRINZIDE,ZESTORETIC) 20-25 MG per tablet, Take 1 tablet by mouth daily  , Disp: , Rfl:     metFORMIN (GLUCOPHAGE-XR) 750 mg 24 hr tablet, Take 1 tablet (750 mg total) by mouth daily with breakfast, Disp: 60 tablet, Rfl: 0    Pharmacist Choice Lancets [facility], USE AS DIRECTED. TEST EVERY DAY PER MD, Disp: , Rfl:     polyethylene glycol (GLYCOLAX) powder,  "as needed, Disp: , Rfl:     semaglutide, 0.25 or 0.5 mg/dose, (Ozempic, 0.25 or 0.5 MG/DOSE,) 2 mg/3 mL injection pen, Inject 0.375 mL (0.25 mg total) under the skin every 7 days, Disp: 3 mL, Rfl: 5    SUBOXONE 12-3 MG FILM, TAKE 1 FILM SUBLINGUALY WITH 2MG STRIPS TOTAL 14MG DAILY, Disp: , Rfl: 0    SUBOXONE 2-0.5 MG, , Disp: , Rfl: 0    traZODone (DESYREL) 100 mg tablet, daily at bedtime as needed, Disp: , Rfl:   Review of Systems   Constitutional:  Negative for activity change, appetite change, fatigue and unexpected weight change.   HENT:  Negative for trouble swallowing.    Eyes:  Negative for visual disturbance.   Respiratory:  Negative for shortness of breath.    Cardiovascular:  Negative for chest pain and palpitations.   Gastrointestinal:  Negative for constipation and diarrhea.   Endocrine: Positive for polydipsia. Negative for polyuria.   Musculoskeletal:  Positive for arthralgias (intermittent).   Skin:  Negative for wound.   Neurological:  Negative for numbness.   Psychiatric/Behavioral: Negative.         Physical Exam:  Body mass index is 38.47 kg/m².  /80   Pulse 69   Ht 5' 8\" (1.727 m)   Wt 115 kg (253 lb)   SpO2 96%   BMI 38.47 kg/m²    Wt Readings from Last 3 Encounters:   08/27/24 115 kg (253 lb)   04/12/24 115 kg (254 lb)   09/08/23 119 kg (262 lb)       Physical Exam  Vitals and nursing note reviewed.   Constitutional:       Appearance: He is well-developed.   HENT:      Head: Normocephalic.   Eyes:      General: No scleral icterus.     Extraocular Movements: EOM normal.   Neck:      Thyroid: No thyromegaly.   Cardiovascular:      Rate and Rhythm: Normal rate and regular rhythm.      Pulses:           Radial pulses are 2+ on the right side and 2+ on the left side.      Heart sounds: No murmur heard.  Pulmonary:      Effort: Pulmonary effort is normal. No respiratory distress.      Breath sounds: Normal breath sounds. No wheezing.   Musculoskeletal:      Cervical back: Neck supple. " "  Skin:     General: Skin is warm and dry.   Neurological:      Mental Status: He is alert.   Psychiatric:         Mood and Affect: Mood and affect normal.       Patient's shoes and socks were not removed.          Labs:   Lab Results   Component Value Date    HGBA1C 11.6 (A) 08/27/2024     Lab Results   Component Value Date    CALCIUM 9.0 04/06/2022    K 3.8 04/06/2022    CO2 28 04/06/2022     04/06/2022    BUN 17 04/06/2022    CREATININE 0.87 04/06/2022     No results found for: \"MICROALBUR\", \"GVTS03MTS\"  eGFR   Date Value Ref Range Status   04/06/2022 99 ml/min/1.73sq m Final     No components found for: \"MALBCRER\"  Lab Results   Component Value Date    HDL 32 (L) 04/06/2022    TRIG 74 04/06/2022     Lab Results   Component Value Date    ALT 36 04/06/2022    AST 15 04/06/2022    ALKPHOS 120 (H) 04/06/2022     Lab Results   Component Value Date    YFB2HQPFJVYU 1.760 04/06/2022         Plan:    Diagnoses and all orders for this visit:    Type 2 diabetes mellitus with hyperglycemia, with long-term current use of insulin ([facility])  HGA1C 11.6%.   Treatment regimen: he never started Humalog. It appears it is not the preferred insulin. Switch to Novolog 14 units BID with meals. Advised to skip dose if skipping meal. Continue other treatments. Send log in 2 weeks. He is overdue for labs and agrees to go to the labs now to have them done. Discussed risks/complications associated with uncontrolled diabetes.  Discussed dietary changes.   Discussed intensive insulin regimen does increase risk for hypoglycemia. Episodes of hypoglycemia can lead to permanent disability and death.  Advised to adhere to diabetic diet, and recommended staying active/exercising routinely as tolerated.  Keep carbohydrates consistent to limit blood glucose fluctuations.  Advised to call if blood sugars less than 70 mg/dl or over 300 mg/dl.   Check blood glucose 3+ times a day  Discussed symptoms and treatment of hypoglycemia.   Discussed use of " CGM to collect additional blood glucose data to reveal trends and patterns that can be used to optimize treatment plan.   Referred to diabetes/nutrition education.   Recommended routine follow-up with podiatry and ophthalmology.   Send log in 2 weeks.    Ordered blood work to complete prior to next visit.  -     POCT hemoglobin A1c  -     Hemoglobin A1C; Future  -     Albumin / creatinine urine ratio; Future  -     Basic metabolic panel; Future  -     metFORMIN (GLUCOPHAGE-XR) 750 mg 24 hr tablet; Take 1 tablet (750 mg total) by mouth daily with breakfast  -     insulin aspart (NovoLOG FlexPen) 100 UNIT/ML injection pen; Take 14 units with breakfast and 14 units with dinner  -     Continuous Glucose Sensor (FreeStyle Gosia 3 Sensor) MISC; Apply every 2 weeks.    Mixed hyperlipidemia  On statin therapy  Managed by PCP    Essential hypertension  Blood pressure adequately controlled, continue current treatment        Discussed with the patient diagnosis and treatment and all questions fully answered. He will call me if any problems arise.    Counseled patient on diagnostic results, prognosis, risk and benefit of treatment options, instruction for management, importance of treatment compliance, risk factor reduction and impressions.      Carrie Virgen PA-C

## 2024-08-27 NOTE — TELEPHONE ENCOUNTER
PA for Continuous Glucose Sensor (FreeStyle Gosia 3 Sensor) MISC SUBMITTED     via    []CMM-KEY:    [x]Surescripts-Case ID # [id number]  []Faxed to plan   []Other website    []Phone call Case ID #      Office notes sent, clinical questions answered. Awaiting determination    Turnaround time for your insurance to make a decision on your Prior Authorization can take 7-21 business days.

## 2024-08-28 ENCOUNTER — APPOINTMENT (OUTPATIENT)
Dept: LAB | Facility: CLINIC | Age: 54
End: 2024-08-28
Payer: MEDICARE

## 2024-08-28 LAB
CREAT UR-MCNC: 108.4 MG/DL
MICROALBUMIN UR-MCNC: 44.7 MG/L
MICROALBUMIN/CREAT 24H UR: 41 MG/G CREATININE (ref 0–30)

## 2024-08-28 PROCEDURE — 82570 ASSAY OF URINE CREATININE: CPT

## 2024-08-28 PROCEDURE — 82043 UR ALBUMIN QUANTITATIVE: CPT

## 2024-08-28 NOTE — TELEPHONE ENCOUNTER
PA for Continuous Glucose Sensor (FreeStyle Gosia 3 Sensor) MISC  APPROVED     Date(s) approved August 27, 2024 to August 27, 2025     Case #     Patient advised by          [][employer] Message  [x]Phone call   []LMOM  []L/M to call office as no active Communication consent on file  []Unable to leave detailed message as VM not approved on Communication consent       Pharmacy advised by    [x]Fax  []Phone call    Approval letter scanned into [employer] No

## 2024-08-29 ENCOUNTER — OFFICE VISIT (OUTPATIENT)
Dept: SLEEP CENTER | Facility: CLINIC | Age: 54
End: 2024-08-29
Payer: MEDICARE

## 2024-08-29 ENCOUNTER — TELEPHONE (OUTPATIENT)
Dept: ENDOCRINOLOGY | Facility: CLINIC | Age: 54
End: 2024-08-29

## 2024-08-29 VITALS
BODY MASS INDEX: 38.34 KG/M2 | HEIGHT: 68 IN | DIASTOLIC BLOOD PRESSURE: 82 MMHG | SYSTOLIC BLOOD PRESSURE: 132 MMHG | WEIGHT: 253 LBS

## 2024-08-29 DIAGNOSIS — E66.9 OBESITY, UNSPECIFIED CLASSIFICATION, UNSPECIFIED OBESITY TYPE, UNSPECIFIED WHETHER SERIOUS COMORBIDITY PRESENT: ICD-10-CM

## 2024-08-29 DIAGNOSIS — G47.19 EXCESSIVE DAYTIME SLEEPINESS: Primary | ICD-10-CM

## 2024-08-29 PROCEDURE — 99244 OFF/OP CNSLTJ NEW/EST MOD 40: CPT | Performed by: PSYCHIATRY & NEUROLOGY

## 2024-08-29 NOTE — PROGRESS NOTES
Assessment/Plan:    1. Excessive daytime sleepiness  -     Diagnostic Sleep Study; Future; Expected date: 08/30/2024  2. Obesity, unspecified classification, unspecified obesity type, unspecified whether serious comorbidity present  -     Ambulatory Referral to Sleep Medicine  We discussed that it is important to assess for obstructive sleep apnea, he has significant risk for this.  He also is treated with Suboxone which could potentially contribute to risk for central sleep apnea as well.  I ordered diagnostic polysomnogram and we will follow-up with him after the test.  We did not discuss treatment for obstructive sleep apnea in great detail, will be better to review options once I have results of the sleep test in an office follow-up.    We discussed importance of weight loss    At the beginning of the visit, he expressed some concern about his blood sugars as he is diabetic.  He felt okay to continue the visit.  I emphasized that for the sleep study, he should bring snacks or other foods in case he feels he has low blood sugar during the night.    We discussed importance of avoiding drowsy driving.  He does not experience this.      Subjective:      Patient ID: Brett Velasco is a 54 y.o. male.    HPI    This is a 53-year-old male referred as a new patient to assess for obstructive sleep apnea.  Past medical history includes diabetes, asthma, hypertension. Is prescribed suboxone, has a hx of heroin addiciton     Cc- referred by his PCP, he is not sure why    Brett describes his sleeping pattern is weird, for 2 years, since he has been diabetic     He was about 10 PM, asleep in 5-30 minutes. Wakes 3 times a night. is awake at 3 AM and can't fall back asleep   .  Ha naps a lot. Sometimes dozes.   He denies drowsy driving.    Lives with his wife.  He has snoring, denies witnessed apneas or snorting.  Usually not hard to breathe through his nose    Denies restless legs, sleepwalking, or dream enactment.    Smokes- 5  "cigarettes a day  No alcohol or recent drug use     [place] Sleepiness Scale  Sitting and reading: Moderate chance of dozing  Watching TV: Moderate chance of dozing  Sitting, inactive in a public place (e.g. a theatre or a meeting): Moderate chance of dozing  As a passenger in a car for an hour without a break: Would never doze  Lying down to rest in the afternoon when circumstances permit: Moderate chance of dozing  Sitting and talking to someone: Slight chance of dozing  Sitting quietly after a lunch without alcohol: Slight chance of dozing  In a car, while stopped for a few minutes in traffic: Would never doze  Total score: 10      Review of Systems  (as above unless noted)    Objective:      Visit Vitals  /82 (BP Location: Left arm, Patient Position: Sitting, Cuff Size: Large)   Ht 5' 8\" (1.727 m)   Wt 115 kg (253 lb)   BMI 38.47 kg/m²   Smoking Status Every Day   BSA 2.26 m²             Physical Exam  Constitutional:       Appearance: Normal appearance.   HENT:      Head: Normocephalic and atraumatic.      Mouth/Throat:      Mouth: Mucous membranes are moist.   Eyes:      Extraocular Movements: Extraocular movements intact.      Pupils: Pupils are equal, round, and reactive to light.   Cardiovascular:      Rate and Rhythm: Normal rate.      Pulses: Normal pulses.      Heart sounds: Normal heart sounds.   Pulmonary:      Effort: Pulmonary effort is normal.      Breath sounds: Normal breath sounds.   Musculoskeletal:      Right lower leg: No edema.      Left lower leg: No edema.   Neurological:      Mental Status: He is alert.   Psychiatric:         Mood and Affect: Mood normal.         Behavior: Behavior normal.         Thought Content: Thought content normal.         Judgment: Judgment normal.         Mallampati 4    tongue : Wide tongue and Scalloping    Jaw and Chin: Poor dentition        "

## 2024-08-29 NOTE — TELEPHONE ENCOUNTER
Discuss lab results with patient. He verbalized he understands and will send us his BS log in 2 weeks.      A1C elevated at 11.5% and glucose elevated at 243  Please continue with changes made during OV yesterday and send log in 2 weeks for review.

## 2024-09-01 ENCOUNTER — HOSPITAL ENCOUNTER (OUTPATIENT)
Dept: SLEEP CENTER | Facility: CLINIC | Age: 54
Discharge: HOME | End: 2024-09-01
Payer: MEDICARE

## 2024-09-01 DIAGNOSIS — G47.19 EXCESSIVE DAYTIME SLEEPINESS: ICD-10-CM

## 2024-09-01 PROCEDURE — 95810 POLYSOM 6/> YRS 4/> PARAM: CPT | Performed by: PSYCHIATRY & NEUROLOGY

## 2024-09-01 PROCEDURE — 95810 POLYSOM 6/> YRS 4/> PARAM: CPT

## 2024-09-02 PROBLEM — G47.33 OSA (OBSTRUCTIVE SLEEP APNEA): Status: ACTIVE | Noted: 2024-09-02

## 2024-09-02 NOTE — PROGRESS NOTES
Sleep Study Documentation    Pre-Sleep Study       Sleep testing procedure explained to patient:YES    Patient napped prior to study:YES- less than 30 minutes. Napped after 2PM: yes    Caffeine:Dayshift worker after 12PM.  Caffeine use:YES- coffee  6 to 18 ounces and soda  12 to 26 ounces    Alcohol:Dayshift workers after 5PM: Alcohol use:NO    Typical day for patient:YES       Study Documentation    Sleep Study Indications: Snoring, EDS    Sleep Study: Diagnostic   Snore:Severe  Supplemental O2: no    O2 flow rate (L/min) range   O2 flow rate (L/min) final   Minimum SaO2 76%  Baseline SaO2 95%    Mode of Therapy:    EKG abnormalities: no     EEG abnormalities: no    Were abnormal behaviors in sleep observed:NO    Is Total Sleep Study Recording Time < 2 hours: N/A    Is Total Sleep Study Recording Time > 2 hours but study is incomplete: N/A    Is Total Sleep Study Recording Time 6 hours or more but sleep was not obtained: NO         Post-Sleep Study    Medication used at bedtime or during sleep study:NO    Patient reports time it took to fall asleep:20 to 30 minutes    Patient reports waking up during study:3 or more times.  Patient reports returning to sleep in 10 to 30 minutes.    Patient reports sleeping 2 to 4 hours without dreaming.    Does the Patient feel this is a typical night of sleep:typical    Patient rated sleepiness: Not sleepy or tired    PAP treatment:no.

## 2024-09-24 ENCOUNTER — TELEPHONE (OUTPATIENT)
Dept: SLEEP CENTER | Facility: CLINIC | Age: 54
End: 2024-09-24

## 2024-09-24 NOTE — TELEPHONE ENCOUNTER
----- Message from John Rios MD sent at 9/8/2024  8:07 PM EDT -----  He needs a follow up  to review   I would recommend CPAP but we did not discuss this at his consult

## 2024-09-24 NOTE — TELEPHONE ENCOUNTER
Called patient and advised sleep study resulted and shows mild sleep apnea.    Scheduled follow up with Dr. Rios 9/26/24 to review results and treatment options.

## 2024-09-26 ENCOUNTER — OFFICE VISIT (OUTPATIENT)
Dept: SLEEP CENTER | Facility: CLINIC | Age: 54
End: 2024-09-26
Payer: MEDICARE

## 2024-09-26 VITALS
WEIGHT: 253 LBS | BODY MASS INDEX: 38.34 KG/M2 | HEART RATE: 72 BPM | HEIGHT: 68 IN | SYSTOLIC BLOOD PRESSURE: 140 MMHG | DIASTOLIC BLOOD PRESSURE: 100 MMHG

## 2024-09-26 DIAGNOSIS — G47.33 OSA (OBSTRUCTIVE SLEEP APNEA): Primary | ICD-10-CM

## 2024-09-26 DIAGNOSIS — G47.19 EXCESSIVE DAYTIME SLEEPINESS: ICD-10-CM

## 2024-09-26 PROCEDURE — 99213 OFFICE O/P EST LOW 20 MIN: CPT | Performed by: PSYCHIATRY & NEUROLOGY

## 2024-09-26 RX ORDER — LIDOCAINE 50 MG/G
1 PATCH TOPICAL DAILY
COMMUNITY
Start: 2024-09-16

## 2024-09-26 NOTE — PROGRESS NOTES
Assessment/Plan:    1. JENNIFER (obstructive sleep apnea)  -     CPAP Auto New DME  2. Excessive daytime sleepiness  -     CPAP Auto New DME    We reviewed that for mild obstructive sleep apnea, treatment is recommended, especially as he has daytime sleepiness.  We reviewed treatment options and I have ordered auto-CPAP for home use.  Despite use of Suboxone, he does not have any sign of central apnea so an in-lab titration is not needed but if this is present with auto-CPAP, would then pursue an in lab test.  I will follow-up with him about 1 month after starting treatment    Regarding his sleepiness, he seems to be in a vicious cycle where he is napping and sleeping excessively during the daytime and then this is negatively affecting his sleep at night.  Certainly if he is able to avoid napping during the day, this may lead to improvement in his sleep quality.    Subjective:      Patient ID: Brett Velasco is a 54 y.o. male.    HPI  This is a 54-year-old male who returns in a follow-up visit, I initially saw him in August, he was referred for assessment for obstructive sleep apnea.  He had irregular sleeping patterns, was prescribed Suboxone due to history of heroin addiction.  He had a diagnostic sleep study completed in September that showed mild obstructive sleep apnea, AHI 10.  He did not have any sign of central apnea, despite use of Suboxone.  He returns today in follow-up    He describes he did not sleep well during the sleep study, felt he only slept 3 hours.  At home sleeps irregularly.  Wakes at 3-4 AM and can't return to sleep.  Naps 3-4 times a day for 15-60 minutes. Has been like the 2-3 years.  Tries to keep busy, walks dog for example          [place] Sleepiness Scale  Sitting and reading: Moderate chance of dozing  Watching TV: Moderate chance of dozing  Sitting, inactive in a public place (e.g. a theatre or a meeting): Moderate chance of dozing  As a passenger in a car for an hour without a break: Would  "never doze  Lying down to rest in the afternoon when circumstances permit: Moderate chance of dozing  Sitting and talking to someone: Would never doze  Sitting quietly after a lunch without alcohol: Would never doze  In a car, while stopped for a few minutes in traffic: Would never doze  Total score: 8      Review of Systems  (As above unless noted)    Objective:      /100   Pulse 72   Ht 5' 8\" (1.727 m)   Wt 115 kg (253 lb)   BMI 38.47 kg/m²          Physical Exam    " Statement Selected

## 2024-09-28 ENCOUNTER — TELEPHONE (OUTPATIENT)
Dept: SLEEP CENTER | Facility: CLINIC | Age: 54
End: 2024-09-28

## 2024-10-14 DIAGNOSIS — E11.65 TYPE 2 DIABETES MELLITUS WITH HYPERGLYCEMIA, WITH LONG-TERM CURRENT USE OF INSULIN (HCC): Primary | ICD-10-CM

## 2024-10-14 DIAGNOSIS — E11.65 TYPE 2 DIABETES MELLITUS WITH HYPERGLYCEMIA, WITH LONG-TERM CURRENT USE OF INSULIN (HCC): ICD-10-CM

## 2024-10-14 DIAGNOSIS — Z79.4 TYPE 2 DIABETES MELLITUS WITH HYPERGLYCEMIA, WITH LONG-TERM CURRENT USE OF INSULIN (HCC): ICD-10-CM

## 2024-10-14 DIAGNOSIS — Z79.4 TYPE 2 DIABETES MELLITUS WITH HYPERGLYCEMIA, WITH LONG-TERM CURRENT USE OF INSULIN (HCC): Primary | ICD-10-CM

## 2024-10-15 RX ORDER — METFORMIN HYDROCHLORIDE 750 MG/1
750 TABLET, EXTENDED RELEASE ORAL
Qty: 90 TABLET | Refills: 1 | Status: SHIPPED | OUTPATIENT
Start: 2024-10-15

## 2024-10-15 RX ORDER — ALCOHOL ANTISEPTIC PADS
PADS, MEDICATED (EA) TOPICAL
Qty: 300 EACH | Refills: 0 | Status: SHIPPED | OUTPATIENT
Start: 2024-10-15

## 2024-10-15 RX ORDER — BLOOD-GLUCOSE METER
EACH MISCELLANEOUS
Qty: 1 KIT | Refills: 0 | Status: SHIPPED | OUTPATIENT
Start: 2024-10-15

## 2024-10-15 RX ORDER — ISOPROPYL ALCOHOL 0.7 ML/ML
SWAB TOPICAL
Qty: 300 EACH | Refills: 1 | Status: SHIPPED | OUTPATIENT
Start: 2024-10-15

## 2024-10-15 NOTE — PATIENT INSTRUCTIONS
"Thank you for trusting me with your care!    I know we often  cover a lot of information at the visit, so if you have follow-up questions, are unclear about the plan, or feel there were important items that we did not discuss or you did not receive clarity on, please don't hesitate to reach out to me.     [employer] messages are preferred for routine matters.  Please make sure to call for urgent matters as there can be a delay in responding to questions over [employer].    IMPORTANT- Prior to a sleep study (at home or in the sleep lab), I strongly recommend contacting your medical insurance first to understand your benefits (including deductible if applicable), coverage for this test, and out of pocket costs.  Even if the test is approved by medical insurance, the cost to you is determined by your medical benefits.  You can also use [employer] on [facility] website, under the drop down \"Patients and Visitors\".  If you have concerns about your out of pocket costs for sleep testing, please contact me/the office before you complete the test and we can discuss if there are alternate options.     I recommend following this advice in general before any lab test, imaging test, doctor visit, surgery, or ordering CPAP supplies as it is best to understand your coverage to avoid unexpected bills after the fact.       Nursing Support:  When: Monday through Friday 7:30A-4:30PM except holidays  Where: Our direct line is [phone]  *3  *1.      If you are having a true emergency please call 911.  In the event that the line is busy or it is after hours please leave a voice message and we will return your call.  Please speak clearly, leaving your full name, birth date, best number to reach you and the reason for your call.   Medication refills: We will need the name of the medication, the dosage, the ordering provider, whether you get a 30 or 90 day refill, and the pharmacy name and address.  Medications will be ordered by the " provider only.  Nurses cannot call in prescriptions.  Please allow 7 days for medication refills.  Physician requested updates: If your provider requested that you call with an update after starting medication, please be ready to provide us the medication and dosage, what time you take your medication, the time you attempt to fall asleep, time you fall asleep, when you wake up, and what time you get out of bed.  Sleep Study Results: We will contact you with sleep study results and/or next steps after the physician has reviewed your testing.

## 2024-12-06 LAB
DME PARACHUTE DELIVERY DATE REQUESTED: NORMAL
DME PARACHUTE ITEM DESCRIPTION: NORMAL
DME PARACHUTE ORDER STATUS: NORMAL
DME PARACHUTE SUPPLIER NAME: NORMAL
DME PARACHUTE SUPPLIER PHONE: NORMAL

## 2024-12-09 DIAGNOSIS — Z79.4 TYPE 2 DIABETES MELLITUS WITH HYPERGLYCEMIA, WITH LONG-TERM CURRENT USE OF INSULIN (HCC): ICD-10-CM

## 2024-12-09 DIAGNOSIS — E11.65 TYPE 2 DIABETES MELLITUS WITH HYPERGLYCEMIA, WITH LONG-TERM CURRENT USE OF INSULIN (HCC): ICD-10-CM

## 2024-12-10 RX ORDER — INSULIN ASPART 100 [IU]/ML
INJECTION, SOLUTION INTRAVENOUS; SUBCUTANEOUS
Qty: 15 ML | Refills: 1 | Status: SHIPPED | OUTPATIENT
Start: 2024-12-10

## 2024-12-16 LAB

## 2024-12-26 LAB

## 2025-01-07 ENCOUNTER — TELEPHONE (OUTPATIENT)
Age: 55
End: 2025-01-07

## 2025-01-07 LAB

## 2025-01-07 NOTE — TELEPHONE ENCOUNTER
Pt was set up on 1/7/25santos LANDEROS on a Resmed S11 set at 5-15 cm and gave Weaver dream ware nasal mask LG.

## 2025-02-04 ENCOUNTER — TELEPHONE (OUTPATIENT)
Dept: ENDOCRINOLOGY | Facility: CLINIC | Age: 55
End: 2025-02-04

## 2025-03-03 DIAGNOSIS — Z79.4 TYPE 2 DIABETES MELLITUS WITH HYPERGLYCEMIA, WITH LONG-TERM CURRENT USE OF INSULIN (HCC): ICD-10-CM

## 2025-03-03 DIAGNOSIS — E11.65 TYPE 2 DIABETES MELLITUS WITH HYPERGLYCEMIA, WITH LONG-TERM CURRENT USE OF INSULIN (HCC): ICD-10-CM

## 2025-03-04 RX ORDER — INSULIN GLARGINE 100 [IU]/ML
INJECTION, SOLUTION SUBCUTANEOUS
Qty: 30 ML | Refills: 1 | Status: SHIPPED | OUTPATIENT
Start: 2025-03-04

## 2025-03-04 RX ORDER — METFORMIN HYDROCHLORIDE 750 MG/1
750 TABLET, EXTENDED RELEASE ORAL
Qty: 90 TABLET | Refills: 1 | Status: SHIPPED | OUTPATIENT
Start: 2025-03-04

## 2025-04-01 DIAGNOSIS — E11.65 TYPE 2 DIABETES MELLITUS WITH HYPERGLYCEMIA, WITH LONG-TERM CURRENT USE OF INSULIN (HCC): ICD-10-CM

## 2025-04-01 DIAGNOSIS — Z79.4 TYPE 2 DIABETES MELLITUS WITH HYPERGLYCEMIA, WITH LONG-TERM CURRENT USE OF INSULIN (HCC): ICD-10-CM

## 2025-04-01 RX ORDER — ISOPROPYL ALCOHOL 0.7 ML/ML
SWAB TOPICAL
Qty: 300 EACH | Refills: 1 | Status: SHIPPED | OUTPATIENT
Start: 2025-04-01

## 2025-05-01 DIAGNOSIS — Z79.4 TYPE 2 DIABETES MELLITUS WITH HYPERGLYCEMIA, WITH LONG-TERM CURRENT USE OF INSULIN (HCC): ICD-10-CM

## 2025-05-01 DIAGNOSIS — E11.65 TYPE 2 DIABETES MELLITUS WITH HYPERGLYCEMIA, WITH LONG-TERM CURRENT USE OF INSULIN (HCC): ICD-10-CM

## 2025-05-01 RX ORDER — INSULIN ASPART 100 [IU]/ML
INJECTION, SOLUTION INTRAVENOUS; SUBCUTANEOUS
Qty: 15 ML | Refills: 1 | Status: SHIPPED | OUTPATIENT
Start: 2025-05-01

## 2025-05-12 LAB
